# Patient Record
Sex: FEMALE | Race: BLACK OR AFRICAN AMERICAN | NOT HISPANIC OR LATINO | Employment: UNEMPLOYED | URBAN - METROPOLITAN AREA
[De-identification: names, ages, dates, MRNs, and addresses within clinical notes are randomized per-mention and may not be internally consistent; named-entity substitution may affect disease eponyms.]

---

## 2017-01-19 ENCOUNTER — HOSPITAL ENCOUNTER (EMERGENCY)
Facility: OTHER | Age: 32
Discharge: HOME | End: 2017-01-19
Attending: EMERGENCY MEDICINE
Payer: MEDICAID

## 2017-01-19 VITALS
SYSTOLIC BLOOD PRESSURE: 120 MMHG | BODY MASS INDEX: 25.71 KG/M2 | DIASTOLIC BLOOD PRESSURE: 80 MMHG | HEART RATE: 69 BPM | TEMPERATURE: 98 F | OXYGEN SATURATION: 99 % | RESPIRATION RATE: 70 BRPM | HEIGHT: 66 IN | WEIGHT: 160 LBS

## 2017-01-19 DIAGNOSIS — S16.1XXA NECK STRAIN, INITIAL ENCOUNTER: Primary | ICD-10-CM

## 2017-01-19 LAB
B-HCG UR QL: NEGATIVE
CTP QC/QA: YES

## 2017-01-19 PROCEDURE — 99283 EMERGENCY DEPT VISIT LOW MDM: CPT | Mod: 25

## 2017-01-19 PROCEDURE — 96372 THER/PROPH/DIAG INJ SC/IM: CPT

## 2017-01-19 RX ORDER — METHOCARBAMOL 500 MG/1
1000 TABLET, FILM COATED ORAL 3 TIMES DAILY PRN
Qty: 20 TABLET | Refills: 0 | Status: SHIPPED | OUTPATIENT
Start: 2017-01-19 | End: 2017-01-24

## 2017-01-19 RX ORDER — IBUPROFEN 800 MG/1
800 TABLET ORAL EVERY 6 HOURS PRN
Qty: 20 TABLET | Refills: 0 | OUTPATIENT
Start: 2017-01-19 | End: 2018-09-26

## 2017-01-19 RX ORDER — KETOROLAC TROMETHAMINE 30 MG/ML
60 INJECTION, SOLUTION INTRAMUSCULAR; INTRAVENOUS
Status: COMPLETED | OUTPATIENT
Start: 2017-01-19 | End: 2017-01-19

## 2017-01-19 RX ORDER — METHOCARBAMOL 500 MG/1
1000 TABLET, FILM COATED ORAL
Status: COMPLETED | OUTPATIENT
Start: 2017-01-19 | End: 2017-01-19

## 2017-01-19 RX ADMIN — KETOROLAC TROMETHAMINE 60 MG: 30 INJECTION, SOLUTION INTRAMUSCULAR at 09:01

## 2017-01-19 RX ADMIN — METHOCARBAMOL 1000 MG: 500 TABLET ORAL at 09:01

## 2017-01-19 NOTE — ED NOTES
"Ambulated to ED room 13.  Presents to the ED with posterior neck pain and upper back pain since Sunday/5 days.  "I can't move my neck and my neck is starting to feel swollen."  Pain with flexion of the neck.  Pain with rotation of the neck.  Does not recall lifting any heavy items.  Denies any falls, trauma, injury or MVA.  Denies any incontinence of urine or stool.  Denies any numbness or tingling.    "

## 2017-01-19 NOTE — ED AVS SNAPSHOT
[facility]  [address]  [facility] [address]               Carlyn Regan   2017  8:55 AM   ED    Description:  Female : 1985   Department:  [facility]           Your Care was Coordinated By:     Provider Role From To    Usha Lopez MD Attending Provider 17 0859 --      Reason for Visit     Neck Pain           Diagnoses this Visit        Comments    Neck strain, initial encounter    -  Primary       ED Disposition     None           To Do List           Follow-up Information     Follow up with Denice Nolan MD.    Specialty:  Internal Medicine    Contact information:    [address]  [address]  [place] [address]  [phone]         These Medications        Disp Refills Start End    methocarbamol (ROBAXIN) 500 MG Tab 20 tablet 0 2017    Take 2 tablets (1,000 mg total) by mouth 3 (three) times daily as needed. - Oral    Pharmacy: [facility] [address] [place] [address] [address] Ph #: [phone]       ibuprofen (ADVIL,MOTRIN) 800 MG tablet 20 tablet 0 2017     Take 1 tablet (800 mg total) by mouth every 6 (six) hours as needed for Pain. - Oral    Pharmacy: [facility] [address] [place] [address] [address] Ph #: [phone]         [facility]     [facility] Nurse Care Line -  Assistance  Registered nurses in the [facility] provide clinical advisement, health education, appointment booking, and other advisory services.  Call for this free service at [phone].             Medications           Message regarding Medications     Verify the changes and/or additions to your medication regime listed below are the same as discussed with your clinician today.  If any of these changes or additions are incorrect, please notify your healthcare provider.        START  "taking these NEW medications        Refills    methocarbamol (ROBAXIN) 500 MG Tab 0    Sig: Take 2 tablets (1,000 mg total) by mouth 3 (three) times daily as needed.    Class: Print    Route: Oral    ibuprofen (ADVIL,MOTRIN) 800 MG tablet 0    Sig: Take 1 tablet (800 mg total) by mouth every 6 (six) hours as needed for Pain.    Class: Print    Route: Oral      These medications were administered today        Dose Freq    ketorolac injection 60 mg 60 mg ED 1 Time    Sig: Inject 60 mg into the muscle ED 1 Time.    Class: Normal    Route: Intramuscular    methocarbamol tablet 1,000 mg 1,000 mg ED 1 Time    Sig: Take 2 tablets (1,000 mg total) by mouth ED 1 Time.    Class: Normal    Route: Oral           Verify that the below list of medications is an accurate representation of the medications you are currently taking.  If none reported, the list may be blank. If incorrect, please contact your healthcare provider. Carry this list with you in case of emergency.           Current Medications     albuterol 90 mcg/actuation inhaler Inhale 2 puffs into the lungs every 6 (six) hours as needed for Wheezing.    ibuprofen (ADVIL,MOTRIN) 800 MG tablet Take 1 tablet (800 mg total) by mouth every 6 (six) hours as needed for Pain.    methocarbamol (ROBAXIN) 500 MG Tab Take 2 tablets (1,000 mg total) by mouth 3 (three) times daily as needed.           Clinical Reference Information           Your Vitals Were     BP Pulse Temp Resp Height Weight    120/80 (BP Location: Left arm, Patient Position: Sitting) 69 98.1 °F (36.7 °C) (Oral) 70 5' 6" (1.676 m) 72.6 kg (160 lb)    Last Period SpO2 BMI          01/06/2017 (Approximate) 99% 25.82 kg/m2        Allergies as of 1/19/2017        Reactions    Oxycodone Hives      Immunizations Administered on Date of Encounter - 1/19/2017     None      ED Micro, Lab, POCT     Start Ordered       Status Ordering Provider    01/19/17 0914 01/19/17 0913  POCT urine pregnancy  Once      Final result     "   ED Imaging Orders     None        Discharge Instructions       We have prescribed you medication for pain and muscle relaxants. Please fill and take as directed.    Please return to the ER if you have chest pain, difficulty breathing, fevers, altered mental status, dizziness, weakness, or any other concerns.      Follow up with your primary care physician.        Discharge References/Attachments     NECK PROBLEMS, UNDERSTANDING (ENGLISH)      [facility] Sign-Up     Activating your [facility] account is as easy as 1-2-3!     1) Visit [email or web address], select Sign Up Now, enter this activation code and your date of birth, then select Next.  [email or web address]  Expires: 3/5/2017  9:27 AM      2) Create a username and password to use when you visit [facility] in the future and select a security question in case you lose your password and select Next.    3) Enter your e-mail address and click Sign Up!    Additional Information  If you have questions, please e-mail [email or web address] or call [phone] to talk to our [facility] staff. Remember, [facility] is NOT to be used for urgent needs. For medical emergencies, dial 911.          [facility] complies with applicable Federal civil rights laws and does not discriminate on the basis of race, color, national origin, age, disability, or sex.        Language Assistance Services     ATTENTION: Language assistance services are available, free of charge. Please call [phone].      ATENCIÓN: Si habla español, tiene a fong disposición servicios gratuitos de asistencia lingüística. Llame al [phone].     [facility] Ý: N?u b?n nói Ti?ng Vi?t, có các d?ch v? h? tr? ngôn ng? mi?n phí dành cho b?n. G?i s? [phone].

## 2017-01-19 NOTE — ED PROVIDER NOTES
Encounter Date: 1/19/2017    SCRIBE #1 NOTE: I, Nora Key, am scribing for, and in the presence of, Dr. Lopez.       History     Chief Complaint   Patient presents with    Neck Pain     pt with  neck pain  and shulder pain x one week .     Review of patient's allergies indicates:   Allergen Reactions    Oxycodone Hives     HPI Comments: Time seen by provider: 9:01 AM    This is a 31 y.o. female with history of migraines who presents with complaint of bilateral neck pain that began 5 days ago and has been constant since its onset.  The patient reports that her pain radiates to the shoulders bilaterally as well to her upper back.  She reports no arm pain, weakness, numbness, or tingling.  She mentions that her pain has been triggering her migraines, which have not been relieved by her current migraine medication, Fioricet.  She states that her pain is worse with movement and during the mornings.  She reports that she has taken goodies, which usually alleviates her pain, as well as tylenol without relief.  She denies any trauma, including injuries or MVC, which may have precipitated her discomfort.    The history is provided by the patient.     Past Medical History   Diagnosis Date    Migraine headache      Past Medical History Pertinent Negatives   Diagnosis Date Noted    Diabetes mellitus 1/19/2017    Hypertension 1/19/2017     History reviewed. No pertinent past surgical history.  History reviewed. No pertinent family history.  Social History   Substance Use Topics    Smoking status: Never Smoker    Smokeless tobacco: Never Used    Alcohol use No     Review of Systems   Constitutional: Negative for chills and fever.   HENT: Negative for facial swelling.    Respiratory: Negative for shortness of breath.    Cardiovascular: Negative for chest pain.   Gastrointestinal: Negative for abdominal pain, nausea and vomiting.   Endocrine: Negative for polyuria.   Genitourinary: Negative for dysuria.   Musculoskeletal:  Positive for arthralgias (bilateral shoulders), back pain (upper) and neck pain. Negative for myalgias.        Negative for arm pain bilaterally.   Skin: Negative for rash.   Neurological: Positive for weakness, numbness and headaches.        Negative for tingling.       Physical Exam   Initial Vitals   BP Pulse Resp Temp SpO2   01/19/17 0845 01/19/17 0845 01/19/17 0845 01/19/17 0845 01/19/17 0845   120/80 69 70 98.1 °F (36.7 °C) 99 %     Physical Exam    Nursing note and vitals reviewed.  Constitutional: She appears well-developed and well-nourished. She is not diaphoretic. No distress.   HENT:   Head: Normocephalic and atraumatic.   Right Ear: External ear normal.   Left Ear: External ear normal.   Eyes: Conjunctivae and EOM are normal. Right eye exhibits no discharge. Left eye exhibits no discharge.   Neck: Normal range of motion. Neck supple.   Cardiovascular: Normal rate, regular rhythm and normal heart sounds.   Pulses:       Radial pulses are 2+ on the right side, and 2+ on the left side.   Pulmonary/Chest: Breath sounds normal. No respiratory distress. She has no wheezes. She has no rhonchi. She has no rales.   Musculoskeletal: Normal range of motion. She exhibits no edema or tenderness.   Tenderness along the superior portion of the trapezius muscles bilaterally extending across both shoulders. Increased pain with abduction of upper extremities.  No midline C spine tenderness.  Full ROM intact in all extremities.     Neurological: She is alert and oriented to person, place, and time.   Strength 5/5 to bilateral upper extremities.  Sensation to light touch intact.    Skin: Skin is warm and dry. No rash and no abscess noted. No erythema. No pallor.   Psychiatric: She has a normal mood and affect. Her behavior is normal. Judgment and thought content normal.         ED Course   Procedures  Labs Reviewed   POCT URINE PREGNANCY      Imaging Results     None                  Medical Decision Making:   History:    Old Medical Records: I decided to obtain old medical records.  Old Records Summarized: records from clinic visits, records from previous admission(s) and other records.  Initial Assessment:   9:01AM:  Pt is a 32 y/o F who presents to ED with bilateral neck pain/upper shoulder pain/upper back pain. Pt appears well, nontoxic. She has ROM of her neck intact and she is neurologically intact.  She does have tenderness along the upper trapezius muscles bilaterally.  I do suspect that her pain is MSK in nature.  I do not feel that any imaging is warranted at this time.  Will plan for toradol and robaxin here along with muscle relaxants and anti-inflammatory medication for home. I counseled pt regarding supportive care measures.  I have discussed with the pt ED return warnings and need for close PCP f/u.  Pt agreeable to plan and all questions answered.  I feel that pt is stable for discharge and management as an outpatient and no further intervention is needed at this time.  Pt is comfortable returning to the ED if needed.  Will DC home in stable condition.      Clinical Tests:   Lab Tests: Ordered and Reviewed            Scribe Attestation:   Scribe #1: I performed the above scribed service and the documentation accurately describes the services I performed. I attest to the accuracy of the note.    Attending Attestation:           Physician Attestation for Scribe:  Physician Attestation Statement for Scribe #1: I, Dr. Lopez, reviewed documentation, as scribed by Nora Key in my presence, and it is both accurate and complete.                 ED Course     Clinical Impression:     1. Neck strain, initial encounter                Usha Lopez MD  01/19/17 3838

## 2017-01-19 NOTE — DISCHARGE INSTRUCTIONS
We have prescribed you medication for pain and muscle relaxants. Please fill and take as directed.    Please return to the ER if you have chest pain, difficulty breathing, fevers, altered mental status, dizziness, weakness, or any other concerns.      Follow up with your primary care physician.

## 2017-04-16 ENCOUNTER — HOSPITAL ENCOUNTER (EMERGENCY)
Facility: OTHER | Age: 32
Discharge: HOME | End: 2017-04-16
Attending: EMERGENCY MEDICINE
Payer: MEDICAID

## 2017-04-16 VITALS
TEMPERATURE: 98 F | HEIGHT: 66 IN | OXYGEN SATURATION: 96 % | RESPIRATION RATE: 16 BRPM | BODY MASS INDEX: 33.11 KG/M2 | DIASTOLIC BLOOD PRESSURE: 78 MMHG | SYSTOLIC BLOOD PRESSURE: 134 MMHG | HEART RATE: 84 BPM | WEIGHT: 206 LBS

## 2017-04-16 DIAGNOSIS — N30.01 ACUTE CYSTITIS WITH HEMATURIA: Primary | ICD-10-CM

## 2017-04-16 LAB
B-HCG UR QL: NEGATIVE
BACTERIA #/AREA URNS HPF: ABNORMAL /HPF
BILIRUB UR QL STRIP: NEGATIVE
CLARITY UR: ABNORMAL
COLOR UR: YELLOW
CTP QC/QA: YES
GLUCOSE UR QL STRIP: NEGATIVE
HGB UR QL STRIP: ABNORMAL
HYALINE CASTS #/AREA URNS LPF: 0 /LPF
KETONES UR QL STRIP: NEGATIVE
LEUKOCYTE ESTERASE UR QL STRIP: ABNORMAL
MICROSCOPIC COMMENT: ABNORMAL
NITRITE UR QL STRIP: POSITIVE
PH UR STRIP: 8 [PH] (ref 5–8)
PROT UR QL STRIP: ABNORMAL
RBC #/AREA URNS HPF: 12 /HPF (ref 0–4)
SP GR UR STRIP: 1.01 (ref 1–1.03)
SQUAMOUS #/AREA URNS HPF: 2 /HPF
URN SPEC COLLECT METH UR: ABNORMAL
UROBILINOGEN UR STRIP-ACNC: NEGATIVE EU/DL
WBC #/AREA URNS HPF: 45 /HPF (ref 0–5)

## 2017-04-16 PROCEDURE — 25000003 PHARM REV CODE 250: Performed by: EMERGENCY MEDICINE

## 2017-04-16 PROCEDURE — 96372 THER/PROPH/DIAG INJ SC/IM: CPT

## 2017-04-16 PROCEDURE — 81025 URINE PREGNANCY TEST: CPT | Performed by: EMERGENCY MEDICINE

## 2017-04-16 PROCEDURE — 99283 EMERGENCY DEPT VISIT LOW MDM: CPT | Mod: 25

## 2017-04-16 PROCEDURE — 63600175 PHARM REV CODE 636 W HCPCS: Performed by: EMERGENCY MEDICINE

## 2017-04-16 PROCEDURE — 81000 URINALYSIS NONAUTO W/SCOPE: CPT

## 2017-04-16 RX ORDER — DICYCLOMINE HYDROCHLORIDE 10 MG/1
20 CAPSULE ORAL
Status: COMPLETED | OUTPATIENT
Start: 2017-04-16 | End: 2017-04-16

## 2017-04-16 RX ORDER — KETOROLAC TROMETHAMINE 30 MG/ML
60 INJECTION, SOLUTION INTRAMUSCULAR; INTRAVENOUS
Status: COMPLETED | OUTPATIENT
Start: 2017-04-16 | End: 2017-04-16

## 2017-04-16 RX ORDER — PHENAZOPYRIDINE HYDROCHLORIDE 200 MG/1
200 TABLET, FILM COATED ORAL 3 TIMES DAILY
Qty: 6 TABLET | Refills: 0 | Status: SHIPPED | OUTPATIENT
Start: 2017-04-16 | End: 2017-04-26

## 2017-04-16 RX ORDER — SULFAMETHOXAZOLE AND TRIMETHOPRIM 800; 160 MG/1; MG/1
1 TABLET ORAL 2 TIMES DAILY
Qty: 14 TABLET | Refills: 0 | Status: SHIPPED | OUTPATIENT
Start: 2017-04-16 | End: 2017-04-23

## 2017-04-16 RX ADMIN — KETOROLAC TROMETHAMINE 60 MG: 30 INJECTION, SOLUTION INTRAMUSCULAR at 03:04

## 2017-04-16 RX ADMIN — DICYCLOMINE HYDROCHLORIDE 20 MG: 10 CAPSULE ORAL at 03:04

## 2017-04-16 NOTE — DISCHARGE INSTRUCTIONS
We have prescribed you antibiotics. Please fill and take as directed. It is important that you completely finish the antibiotics.      We have provided you with medication for the pain. Please fill and take as directed.    Please return to the ER if you have chest pain, difficulty breathing, fevers, altered mental status, dizziness, weakness, or any other concerns.      Follow up with your primary care physician.

## 2017-04-16 NOTE — ED AVS SNAPSHOT
[facility]  [address]  [facility] [address]               Carlyn Regan   2017  3:13 PM   ED    Description:  Female : 1985   Department:  [facility]           Your Care was Coordinated By:     Provider Role From To    Usha Lopez MD Attending Provider 17 1514 --      Reason for Visit     Abdominal Cramping           Diagnoses this Visit        Comments    Acute cystitis with hematuria    -  Primary       ED Disposition     None           To Do List           Follow-up Information     Follow up with Denice Nolan MD.    Specialty:  Internal Medicine    Contact information:    [address]  [address]  [place] [address]  [phone]         These Medications        Disp Refills Start End    phenazopyridine (PYRIDIUM) 200 MG tablet 6 tablet 0 2017    Take 1 tablet (200 mg total) by mouth 3 (three) times daily. - Oral    Pharmacy: [facility] [address] [place] [address] [address] Ph #: [phone]       sulfamethoxazole-trimethoprim 800-160mg (BACTRIM DS) 800-160 mg Tab 14 tablet 0 2017    Take 1 tablet by mouth 2 (two) times daily. - Oral    Pharmacy: [facility] [address] [place] [address] [address] Ph #: [phone]         [facility]     [facility] Nurse Care Line -  Assistance  Unless otherwise directed by your provider, please contact [facility], our nurse care line that is available for / assistance.     Registered nurses in the [facility] provide: appointment scheduling, clinical advisement, health education, and other advisory services.  Call: [phone] (toll free)               Medications           Message regarding Medications     Verify the changes and/or additions to your medication regime listed below are the same as discussed  "with your clinician today.  If any of these changes or additions are incorrect, please notify your healthcare provider.        START taking these NEW medications        Refills    phenazopyridine (PYRIDIUM) 200 MG tablet 0    Sig: Take 1 tablet (200 mg total) by mouth 3 (three) times daily.    Class: Print    Route: Oral    sulfamethoxazole-trimethoprim 800-160mg (BACTRIM DS) 800-160 mg Tab 0    Sig: Take 1 tablet by mouth 2 (two) times daily.    Class: Print    Route: Oral      These medications were administered today        Dose Freq    dicyclomine capsule 20 mg 20 mg ED 1 Time    Sig: Take 2 capsules (20 mg total) by mouth ED 1 Time.    Class: Normal    Route: Oral    ketorolac injection 60 mg 60 mg ED 1 Time    Sig: Inject 60 mg into the muscle ED 1 Time.    Class: Normal    Route: Intramuscular           Verify that the below list of medications is an accurate representation of the medications you are currently taking.  If none reported, the list may be blank. If incorrect, please contact your healthcare provider. Carry this list with you in case of emergency.           Current Medications     ibuprofen (ADVIL,MOTRIN) 800 MG tablet Take 1 tablet (800 mg total) by mouth every 6 (six) hours as needed for Pain.    UNKNOWN TO PATIENT     albuterol 90 mcg/actuation inhaler Inhale 2 puffs into the lungs every 6 (six) hours as needed for Wheezing.    phenazopyridine (PYRIDIUM) 200 MG tablet Take 1 tablet (200 mg total) by mouth 3 (three) times daily.    sulfamethoxazole-trimethoprim 800-160mg (BACTRIM DS) 800-160 mg Tab Take 1 tablet by mouth 2 (two) times daily.           Clinical Reference Information           Your Vitals Were     BP Pulse Temp Resp Height Weight    138/83 94 98.2 °F (36.8 °C) (Oral) 18 5' 6" (1.676 m) 93.4 kg (206 lb)    SpO2 BMI             98% 33.25 kg/m2         Allergies as of 4/16/2017     No Known Allergies      Immunizations Administered on Date of Encounter - 4/16/2017     None      ED " Micro, Lab, POCT     Start Ordered       Status Ordering Provider    04/16/17 1510 04/16/17 1509  Urinalysis  STAT      Final result     04/16/17 1510 04/16/17 1509  POCT urine pregnancy  Once      Final result     04/16/17 1509 04/16/17 1509  Urinalysis Microscopic  Once      Final result       ED Imaging Orders     None        Discharge Instructions       We have prescribed you antibiotics. Please fill and take as directed. It is important that you completely finish the antibiotics.      We have provided you with medication for the pain. Please fill and take as directed.    Please return to the ER if you have chest pain, difficulty breathing, fevers, altered mental status, dizziness, weakness, or any other concerns.      Follow up with your primary care physician.        Discharge References/Attachments     URINARY TRACT INFECTIONS (UTIS), UNDERSTANDING (ENGLISH)      [facility] Sign-Up     Activating your [facility] account is as easy as 1-2-3!     1) Visit [email or web address], select Sign Up Now, enter this activation code and your date of birth, then select Next.  [email or web address]  Expires: 5/31/2017  3:15 PM      2) Create a username and password to use when you visit [facility] in the future and select a security question in case you lose your password and select Next.    3) Enter your e-mail address and click Sign Up!    Additional Information  If you have questions, please e-mail [email or web address] or call [phone] to talk to our [facility] staff. Remember, [facility] is NOT to be used for urgent needs. For medical emergencies, dial 911.          [facility] complies with applicable Federal civil rights laws and does not discriminate on the basis of race, color, national origin, age, disability, or sex.        Language Assistance Services     ATTENTION: Language assistance services are available, free of charge. Please call [phone].      ATENCIÓN: mariluz Perdomo  disposición servicios gratuitos de asistencia lingüística. Llenrique al [phone].     [facility] Ý: N?u b?n nói Ti?ng Vi?t, có các d?ch v? h? tr? ngôn ng? mi?n phí dành cho b?n. G?i s? [phone].

## 2017-04-16 NOTE — ED TRIAGE NOTES
"CO abdominal pain starting 2 days ago after eating crawfish, unable to have BM, episodes of regurgitation, denies any N/V at present. Pt reports pain in her stomach as a 7 on a 0-10 scale, feels "backed up, like I cant have a bowel movement".  "

## 2017-04-16 NOTE — ED NOTES
Pt updated on POC. Comfort, position and restroom needs addressed. Bed remains in lowest position, side rails up x2, call light in reach. Pt instructed to call for assistance.

## 2017-04-16 NOTE — ED PROVIDER NOTES
"Encounter Date: 4/16/2017    SCRIBE #1 NOTE: I, Nora Shahid, am scribing for, and in the presence of, Dr. Lopez.       History     Chief Complaint   Patient presents with    Abdominal Cramping     ate crabs and crawfish yesterday, x30 min later one episode of emesis and cramping, abd cramps have lessened. denies nausea at this time     Review of patient's allergies indicates:  No Known Allergies  HPI Comments: Time seen by provider: 3:24 PM    This is a 32 y.o. female who presents with complaint of abdominal pain that has persisted since its onset yesterday.  The patient states that her abdominal "cramping" began after eating seafood yesterday.  She also claims that she had one episode of nausea and vomiting yesterday, but it has completely resolved.  She reports a separate complaint of dysuria and increased urinary urgency that have persisted for the past 2 days, which she "just wanted to get checked out" while she is here.  She mentions no fever, chills, diarrhea, or changes in urinary urgency.  Although her symptoms have improved since their onset, the patient states that she does not feel "back to normal just yet."  She reports no identifying, alleviating, or exacerbating factors.  She mentions no major medical problems or daily prescription medications.      The history is provided by the patient.     Past Medical History:   Diagnosis Date    Migraine headache      History reviewed. No pertinent surgical history.  History reviewed. No pertinent family history.  Social History   Substance Use Topics    Smoking status: Never Smoker    Smokeless tobacco: Never Used    Alcohol use No     Review of Systems   Constitutional: Negative for chills and fever.   HENT: Negative for facial swelling.    Respiratory: Negative for shortness of breath.    Cardiovascular: Negative for chest pain.   Gastrointestinal: Positive for abdominal pain. Negative for diarrhea. Nausea: Resolved. Vomiting: Resolved.   Endocrine: " Negative for polyuria.   Genitourinary: Positive for dysuria and urgency.   Musculoskeletal: Negative for myalgias.   Skin: Negative for rash.   Neurological: Negative for headaches.       Physical Exam   Initial Vitals   BP Pulse Resp Temp SpO2   04/16/17 1507 04/16/17 1507 04/16/17 1507 04/16/17 1507 04/16/17 1507   125/77 98 18 98.2 °F (36.8 °C) 100 %     Physical Exam    Nursing note and vitals reviewed.  Constitutional: She appears well-developed and well-nourished. She is not diaphoretic. No distress.   HENT:   Head: Normocephalic and atraumatic.   Right Ear: External ear normal.   Left Ear: External ear normal.   Eyes: EOM are normal. Right eye exhibits no discharge. Left eye exhibits no discharge.   Neck: Normal range of motion. Neck supple.   Cardiovascular: Normal rate, regular rhythm and normal heart sounds.   Pulmonary/Chest: Breath sounds normal. No respiratory distress. She has no wheezes. She has no rhonchi. She has no rales.   Abdominal: Soft. Bowel sounds are normal. She exhibits no distension. There is no tenderness. There is no rebound, no guarding and no CVA tenderness.   Musculoskeletal: Normal range of motion. She exhibits no edema or tenderness.   Neurological: She is alert and oriented to person, place, and time.   Skin: Skin is warm and dry. No rash and no abscess noted. No erythema. No pallor.   Psychiatric: She has a normal mood and affect. Her behavior is normal. Judgment and thought content normal.         ED Course   Procedures  Labs Reviewed   URINALYSIS - Abnormal; Notable for the following:        Result Value    Appearance, UA Hazy (*)     Protein, UA 1+ (*)     Occult Blood UA 3+ (*)     Nitrite, UA Positive (*)     Leukocytes, UA 3+ (*)     All other components within normal limits   URINALYSIS MICROSCOPIC - Abnormal; Notable for the following:     RBC, UA 12 (*)     WBC, UA 45 (*)     Bacteria, UA Moderate (*)     All other components within normal limits   POCT URINE PREGNANCY      Imaging Results     None                  Medical Decision Making:   History:   Old Medical Records: I decided to obtain old medical records.  Old Records Summarized: records from previous admission(s), other records and records from clinic visits.  Initial Assessment:   3:24PM:  Pt is a 33 y/o F who presents to ED with lower abdominal pain and an episode of N/V yesterday. She does also admit to associated urinary symptoms.  Will plan for UA along with analgesia, will continue to follow and reassess.    Clinical Tests:   Lab Tests: Ordered and Reviewed    4:16 PM:  Pt doing well.  She does have evidence of a UTI.  She has positive nitrite, 3+ leukocytes, and moderate bacteria, likely contributing to her symptoms.  Will plan for abx and pyridium. I updated pt regarding results and counseled pt regarding supportive care measures.  I have discussed with the pt ED return warnings and need for close PCP f/u.  Pt agreeable to plan and all questions answered.  I feel that pt is stable for discharge and management as an outpatient and no further intervention is needed at this time.  Pt is comfortable returning to the ED if needed.  Will DC home in stable condition.                   Scribe Attestation:   Scribe #1: I performed the above scribed service and the documentation accurately describes the services I performed. I attest to the accuracy of the note.    Attending Attestation:           Physician Attestation for Scribe:  Physician Attestation Statement for Scribe #1: I, Dr. Lopez, reviewed documentation, as scribed by Nora Key in my presence, and it is both accurate and complete.                 ED Course     Clinical Impression:     1. Acute cystitis with hematuria                Usha Lopez MD  04/16/17 8221

## 2017-08-09 ENCOUNTER — HOSPITAL ENCOUNTER (OUTPATIENT)
Dept: RADIOLOGY | Facility: OTHER | Age: 32
Discharge: HOME | End: 2017-08-09
Attending: PSYCHIATRY & NEUROLOGY
Payer: MEDICAID

## 2017-08-09 DIAGNOSIS — G43.009 MIGRAINE WITHOUT AURA AND WITHOUT STATUS MIGRAINOSUS, NOT INTRACTABLE: ICD-10-CM

## 2017-08-09 PROCEDURE — 70551 MRI BRAIN STEM W/O DYE: CPT | Mod: 26,,, | Performed by: RADIOLOGY

## 2017-08-09 PROCEDURE — 70551 MRI BRAIN STEM W/O DYE: CPT | Mod: TC

## 2018-09-26 ENCOUNTER — HOSPITAL ENCOUNTER (EMERGENCY)
Facility: OTHER | Age: 33
Discharge: HOME | End: 2018-09-26
Attending: EMERGENCY MEDICINE
Payer: MEDICAID

## 2018-09-26 VITALS
HEIGHT: 66 IN | SYSTOLIC BLOOD PRESSURE: 106 MMHG | DIASTOLIC BLOOD PRESSURE: 72 MMHG | TEMPERATURE: 98 F | RESPIRATION RATE: 18 BRPM | WEIGHT: 158.19 LBS | HEART RATE: 66 BPM | OXYGEN SATURATION: 100 % | BODY MASS INDEX: 25.42 KG/M2

## 2018-09-26 DIAGNOSIS — M25.572 ANKLE PAIN, LEFT: ICD-10-CM

## 2018-09-26 DIAGNOSIS — M79.672 FOOT PAIN, LEFT: ICD-10-CM

## 2018-09-26 DIAGNOSIS — S93.402A MILD ANKLE SPRAIN, LEFT, INITIAL ENCOUNTER: Primary | ICD-10-CM

## 2018-09-26 LAB
B-HCG UR QL: NEGATIVE
CTP QC/QA: YES

## 2018-09-26 PROCEDURE — 81025 URINE PREGNANCY TEST: CPT | Performed by: EMERGENCY MEDICINE

## 2018-09-26 PROCEDURE — 99284 EMERGENCY DEPT VISIT MOD MDM: CPT | Mod: 25

## 2018-09-26 RX ORDER — IBUPROFEN 600 MG/1
600 TABLET ORAL EVERY 6 HOURS PRN
Qty: 20 TABLET | Refills: 0 | OUTPATIENT
Start: 2018-09-26 | End: 2019-12-16

## 2018-09-26 NOTE — ED TRIAGE NOTES
"Pt Presents to ED with C/O left ankle pain x 3 weeks. Pt denies chest pain, SOB, N/V/D. Pt states " it hurts the most when I walk on it". Pt admits to having an MRI because of history of migraines. Pt AAO x 4, calm and cooperative.   "

## 2018-09-26 NOTE — ED PROVIDER NOTES
Encounter Date: 9/26/2018    SCRIBE #1 NOTE: I, Lencho Rosario, am scribing for, and in the presence of, Dr. Matson.       History     Chief Complaint   Patient presents with    Ankle Pain     L ankle pain x 1 wk. reports working out frequently but denies any known injury     Time seen by provider: 12:04 PM    This is a 33 y.o. female with a history of migraines who presents with complaint of left ankle pain that began approximately one week ago. She denies any fall or injury to the left ankle. She reports that she is unable to put pressure on the ankle without pain. She reports that when the pain began she was still able to exercise on it without trouble.  She reports that when she runs on it everyday, which causes exacerbates the pain. She has been taking ibuprofen and aspirin with little relief. She denies fever, sore throat, chest pain, shortness of breath, cough, nausea, vomiting, diarrhea, and dysuria.      The history is provided by the patient.     Review of patient's allergies indicates:  No Known Allergies  Past Medical History:   Diagnosis Date    Migraine headache      History reviewed. No pertinent surgical history.  History reviewed. No pertinent family history.  Social History     Tobacco Use    Smoking status: Never Smoker    Smokeless tobacco: Never Used   Substance Use Topics    Alcohol use: No    Drug use: No     Review of Systems   Constitutional: Negative for fever.   HENT: Negative for sore throat.    Respiratory: Negative for shortness of breath.    Cardiovascular: Negative for chest pain.   Gastrointestinal: Negative for nausea.   Genitourinary: Negative for dysuria.   Musculoskeletal: Negative for back pain.        Positive for left ankle pain.   Skin: Negative for rash.   Neurological: Negative for weakness.   Hematological: Does not bruise/bleed easily.       Physical Exam     Initial Vitals [09/26/18 1116]   BP Pulse Resp Temp SpO2   110/68 77 18 98.2 °F (36.8 °C) 100 %      MAP        --         Physical Exam    Nursing note and vitals reviewed.  Constitutional: She appears well-developed and well-nourished. She is not diaphoretic. No distress.   HENT:   Head: Normocephalic and atraumatic.   Right Ear: External ear normal.   Left Ear: External ear normal.   Eyes: EOM are normal. Pupils are equal, round, and reactive to light. Right eye exhibits no discharge. Left eye exhibits no discharge.   Neck: Normal range of motion.   Cardiovascular: Normal rate, regular rhythm and normal heart sounds. Exam reveals no gallop and no friction rub.    No murmur heard.  Pulmonary/Chest: Breath sounds normal. No respiratory distress. She has no wheezes. She has no rhonchi. She has no rales.   Abdominal: Soft. There is no tenderness. There is no rebound and no guarding.   Musculoskeletal: Normal range of motion. She exhibits tenderness. She exhibits no edema.   Left lower extremity:  Tenderness of bilateral malleoli and base of fifth metatarsal. No tenderness to the fibular head. 2+ DP pulses.    Neurological: She is alert and oriented to person, place, and time. She has normal strength. No sensory deficit.   Skin: Skin is warm and dry. No rash and no abscess noted. No erythema. No pallor.   Psychiatric: She has a normal mood and affect. Her behavior is normal. Judgment and thought content normal.         ED Course   Procedures  Labs Reviewed   POCT URINE PREGNANCY          Imaging Results          X-Ray Ankle Complete Left (Final result)  Result time 09/26/18 13:03:05    Final result by Alex Gustafson MD (09/26/18 13:03:05)                 Impression:      No acute abnormality or significant arthritis.      Electronically signed by: Alex Gustafson MD  Date:    09/26/2018  Time:    13:03             Narrative:    EXAMINATION:  XR FOOT COMPLETE 3 VIEW LEFT; XR ANKLE COMPLETE 3 VIEW LEFT    CLINICAL HISTORY:  .  Pain in left foot; Pain in left ankle and joints of left foot    TECHNIQUE:  AP, lateral and  oblique views of the left foot and ankle were performed.    COMPARISON:  None    FINDINGS:  The skeletal structures are intact with satisfactory alignment.  No fracture or dislocation is identified.  Ankle joint space is normal.  Joint spaces in the foot are also satisfactory without significant arthritis.  A bunion is developing.  No focal soft tissue swelling is seen.                               X-Ray Foot Complete Left (Final result)  Result time 09/26/18 13:03:05    Final result by Alex Gustafson MD (09/26/18 13:03:05)                 Impression:      No acute abnormality or significant arthritis.      Electronically signed by: Alex Gustafson MD  Date:    09/26/2018  Time:    13:03             Narrative:    EXAMINATION:  XR FOOT COMPLETE 3 VIEW LEFT; XR ANKLE COMPLETE 3 VIEW LEFT    CLINICAL HISTORY:  .  Pain in left foot; Pain in left ankle and joints of left foot    TECHNIQUE:  AP, lateral and oblique views of the left foot and ankle were performed.    COMPARISON:  None    FINDINGS:  The skeletal structures are intact with satisfactory alignment.  No fracture or dislocation is identified.  Ankle joint space is normal.  Joint spaces in the foot are also satisfactory without significant arthritis.  A bunion is developing.  No focal soft tissue swelling is seen.                              X-Rays:   Independently Interpreted Readings:   Other Readings:  Left ankle: No fracture, dislocation, or acute process.    Medical Decision Making:   Clinical Tests:   Lab Tests: Ordered and Reviewed  Radiological Study: Ordered and Reviewed  ED Management:  Emergent evaluation of a 33-year-old female who presents complaint of left ankle pain, no trauma.  Vital signs are benign, afebrile.  On exam she is tender diffusely over the ankle and also the foot.  There is no evidence of cellulitis or infection, no overlying skin change.  There is no evidence of vascular insufficiency, pulses good in skin is warm.  I  suspect that she had a mild ankle sprain but did not rested it has worsened.  Patient admits she exercises all day every day.  X-ray is negative.  She is advised to rest her foot and ankle, and is prescribed ibuprofen.  She is placed in an Ace wrap.  She is discharged in good condition and encourage close follow-up with PCP or return for new or worsening symptoms.            Scribe Attestation:   Scribe #1: I performed the above scribed service and the documentation accurately describes the services I performed. I attest to the accuracy of the note.    Attending Attestation:           Physician Attestation for Scribe:  Physician Attestation Statement for Scribe #1: I, Dr. Matson, reviewed documentation, as scribed by Lencho Rosario in my presence, and it is both accurate and complete.                    Clinical Impression:     1. Mild ankle sprain, left, initial encounter    2. Foot pain, left    3. Ankle pain, left                                 Jenna Matson MD  09/26/18 [id number]

## 2019-12-16 ENCOUNTER — HOSPITAL ENCOUNTER (EMERGENCY)
Facility: OTHER | Age: 34
Discharge: HOME | End: 2019-12-16
Attending: EMERGENCY MEDICINE
Payer: MEDICAID

## 2019-12-16 VITALS
HEART RATE: 69 BPM | BODY MASS INDEX: 24.99 KG/M2 | OXYGEN SATURATION: 100 % | SYSTOLIC BLOOD PRESSURE: 119 MMHG | TEMPERATURE: 99 F | DIASTOLIC BLOOD PRESSURE: 58 MMHG | HEIGHT: 65 IN | RESPIRATION RATE: 20 BRPM | WEIGHT: 150 LBS

## 2019-12-16 DIAGNOSIS — R60.0 EDEMA OF ABDOMINAL WALL: Primary | ICD-10-CM

## 2019-12-16 LAB
B-HCG UR QL: NEGATIVE
CTP QC/QA: YES

## 2019-12-16 PROCEDURE — 81025 URINE PREGNANCY TEST: CPT | Performed by: EMERGENCY MEDICINE

## 2019-12-16 PROCEDURE — 99284 EMERGENCY DEPT VISIT MOD MDM: CPT | Mod: 25

## 2019-12-16 RX ORDER — ETODOLAC 400 MG/1
400 TABLET, FILM COATED ORAL 2 TIMES DAILY PRN
Qty: 20 TABLET | Refills: 0 | Status: SHIPPED | OUTPATIENT
Start: 2019-12-16

## 2019-12-16 NOTE — DISCHARGE INSTRUCTIONS
"Swelling of the skin over your chest and abdominal wall is called edema. It is caused by extra fluid collecting in the tissues.   Some of the causes for swelling" in only a single leg include:  · Infection in the skin  · Long-term problem with a vein not working well (venous insufficiency)  · Insect bite or sting   · Injury of the skin    Medical treatment will depend on what is causing your swelling.  Home care  Follow these guidelines when caring for yourself at home:  · Dont wear tight clothing.  · Take any medicines as directed.  Follow-up care  Follow up with your healthcare provider as advised.  Call 911  Call 911 if any of these occur:  · Shortness of breath or trouble breathing  · Abdominal pain  · Fevers, chills  · Chest pain  · Coughing up blood  · Fainting or loss of consciousness   When to seek medical advice  Call your healthcare provider right away if any of these occur:  · Increased pain, swelling, warmth, or redness of the leg, ankle, or foot  · Fever of 100.4°F (38ºC) or higher, or as directed by your healthcare provider  · Weakness or dizziness  · Shaking chills  · Drenching sweats    "

## 2019-12-16 NOTE — ED NOTES
Pt to ED with c/o L rib pain for several weeks, comes to ED now for evaluation. TTP along lowest L rib, protrusion noted. Admits to some SOB. Pt denies injury, CP, N/V/D, diaphoresis.     Two patient identifiers have been checked and are correct.      Appearance: Pt awake, alert & oriented to person, place & time. Pt in no acute distress at present time. Pt is clean and well groomed with clothes appropriately fastened.   Skin: Skin warm, dry & intact. Color consistent with ethnicity. Mucous membranes moist. No breakdown or brusing noted. Small protrusion to lowest L rib noted, TTP.   Musculoskeletal: Patient moving all extremities well, no obvious swelling or deformities noted.   Respiratory: Respirations spontaneous, even, and non-labored. Visible chest rise noted. Airway is open and patent. No accessory muscle use noted.   Neurologic: Sensation is intact. Speech is clear and appropriate. Eyes open spontaneously, behavior appropriate to situation, follows commands, facial expression symmetrical, bilateral hand grasp equal and even, purposeful motor response noted.  Cardiac: All peripheral pulses present. No Bilateral lower extremity edema. Cap refill is <3 seconds.  Abdomen: Abdomen soft, non-tender to palpation.   : Pt reports no dysuria or hematuria.

## 2020-09-29 ENCOUNTER — HOSPITAL ENCOUNTER (OUTPATIENT)
Facility: OTHER | Age: 35
Discharge: HOME | End: 2020-09-30
Attending: EMERGENCY MEDICINE | Admitting: EMERGENCY MEDICINE
Payer: MEDICAID

## 2020-09-29 DIAGNOSIS — S09.90XA INJURY OF HEAD, INITIAL ENCOUNTER: Primary | ICD-10-CM

## 2020-09-29 LAB
B-HCG UR QL: NEGATIVE
CTP QC/QA: YES
CTP QC/QA: YES
SARS-COV-2 RDRP RESP QL NAA+PROBE: NEGATIVE

## 2020-09-29 PROCEDURE — 90471 IMMUNIZATION ADMIN: CPT | Performed by: EMERGENCY MEDICINE

## 2020-09-29 PROCEDURE — 25000003 PHARM REV CODE 250: Performed by: EMERGENCY MEDICINE

## 2020-09-29 PROCEDURE — 99285 EMERGENCY DEPT VISIT HI MDM: CPT | Mod: 25

## 2020-09-29 PROCEDURE — 25000003 PHARM REV CODE 250: Performed by: PHYSICIAN ASSISTANT

## 2020-09-29 PROCEDURE — 81025 URINE PREGNANCY TEST: CPT | Performed by: EMERGENCY MEDICINE

## 2020-09-29 PROCEDURE — 90715 TDAP VACCINE 7 YRS/> IM: CPT | Performed by: EMERGENCY MEDICINE

## 2020-09-29 PROCEDURE — G0378 HOSPITAL OBSERVATION PER HR: HCPCS

## 2020-09-29 PROCEDURE — 63600175 PHARM REV CODE 636 W HCPCS: Performed by: EMERGENCY MEDICINE

## 2020-09-29 PROCEDURE — U0002 COVID-19 LAB TEST NON-CDC: HCPCS | Performed by: EMERGENCY MEDICINE

## 2020-09-29 PROCEDURE — 12011 RPR F/E/E/N/L/M 2.5 CM/<: CPT

## 2020-09-29 RX ORDER — ACETAMINOPHEN 500 MG
1000 TABLET ORAL
Status: COMPLETED | OUTPATIENT
Start: 2020-09-29 | End: 2020-09-29

## 2020-09-29 RX ORDER — IBUPROFEN 600 MG/1
600 TABLET ORAL EVERY 6 HOURS PRN
Qty: 20 TABLET | Refills: 0 | Status: SHIPPED | OUTPATIENT
Start: 2020-09-29

## 2020-09-29 RX ORDER — HYDROCODONE BITARTRATE AND ACETAMINOPHEN 5; 325 MG/1; MG/1
1 TABLET ORAL
Status: COMPLETED | OUTPATIENT
Start: 2020-09-29 | End: 2020-09-29

## 2020-09-29 RX ORDER — IBUPROFEN 400 MG/1
800 TABLET ORAL
Status: COMPLETED | OUTPATIENT
Start: 2020-09-29 | End: 2020-09-29

## 2020-09-29 RX ORDER — LIDOCAINE HYDROCHLORIDE 10 MG/ML
5 INJECTION INFILTRATION; PERINEURAL
Status: COMPLETED | OUTPATIENT
Start: 2020-09-29 | End: 2020-09-29

## 2020-09-29 RX ORDER — METHOCARBAMOL 500 MG/1
1000 TABLET, FILM COATED ORAL 3 TIMES DAILY
Qty: 30 TABLET | Refills: 0 | Status: SHIPPED | OUTPATIENT
Start: 2020-09-29 | End: 2020-10-04

## 2020-09-29 RX ORDER — MUPIROCIN 20 MG/G
1 OINTMENT TOPICAL
Status: COMPLETED | OUTPATIENT
Start: 2020-09-29 | End: 2020-09-29

## 2020-09-29 RX ORDER — SODIUM CHLORIDE 0.9 % (FLUSH) 0.9 %
10 SYRINGE (ML) INJECTION
Status: DISCONTINUED | OUTPATIENT
Start: 2020-09-29 | End: 2020-09-30 | Stop reason: HOSPADM

## 2020-09-29 RX ADMIN — HYDROCODONE BITARTRATE AND ACETAMINOPHEN 1 TABLET: 5; 325 TABLET ORAL at 11:09

## 2020-09-29 RX ADMIN — MUPIROCIN 22 G: 20 OINTMENT TOPICAL at 09:09

## 2020-09-29 RX ADMIN — LIDOCAINE HYDROCHLORIDE 5 ML: 10 INJECTION, SOLUTION INFILTRATION; PERINEURAL at 08:09

## 2020-09-29 RX ADMIN — IBUPROFEN 800 MG: 400 TABLET, FILM COATED ORAL at 07:09

## 2020-09-29 RX ADMIN — CLOSTRIDIUM TETANI TOXOID ANTIGEN (FORMALDEHYDE INACTIVATED), CORYNEBACTERIUM DIPHTHERIAE TOXOID ANTIGEN (FORMALDEHYDE INACTIVATED), BORDETELLA PERTUSSIS TOXOID ANTIGEN (GLUTARALDEHYDE INACTIVATED), BORDETELLA PERTUSSIS FILAMENTOUS HEMAGGLUTININ ANTIGEN (FORMALDEHYDE INACTIVATED), BORDETELLA PERTUSSIS PERTACTIN ANTIGEN, AND BORDETELLA PERTUSSIS FIMBRIAE 2/3 ANTIGEN 0.5 ML: 5; 2; 2.5; 5; 3; 5 INJECTION, SUSPENSION INTRAMUSCULAR at 07:09

## 2020-09-29 RX ADMIN — ACETAMINOPHEN 1000 MG: 500 TABLET, FILM COATED ORAL at 07:09
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
## 2020-09-29 NOTE — Clinical Note
"Carlyn MARYAMIRA" Jass was seen and treated in our emergency department on 9/29/2020.  She may return to work on 10/02/2020.       If you have any questions or concerns, please don't hesitate to call.      Fidencio Johnson RN    "

## 2020-09-29 NOTE — Clinical Note
"Yanick Echols accompanied Carlyn DUNCAN "YING Regan to the emergency department on 9/29/2020. They may return to work on 10/02/2020.      If you have any questions or concerns, please don't hesitate to call.      Fidencio Johnson RN"

## 2020-09-30 VITALS
OXYGEN SATURATION: 95 % | WEIGHT: 160 LBS | RESPIRATION RATE: 18 BRPM | DIASTOLIC BLOOD PRESSURE: 66 MMHG | BODY MASS INDEX: 26.66 KG/M2 | HEART RATE: 75 BPM | TEMPERATURE: 98 F | SYSTOLIC BLOOD PRESSURE: 109 MMHG | HEIGHT: 65 IN

## 2020-09-30 PROBLEM — S09.90XA HEAD INJURY: Chronic | Status: ACTIVE | Noted: 2020-09-29

## 2020-09-30 PROBLEM — S01.81XA LACERATION OF FACE: Status: ACTIVE | Noted: 2020-09-30

## 2020-09-30 PROCEDURE — G0378 HOSPITAL OBSERVATION PER HR: HCPCS
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
## 2020-09-30 NOTE — ED NOTES
Patient arrived to unit via w/c, ambulatory to bed without difficulty, visitor at bedside. Patient AAOx4, NAD, denies pain, or any needs at this time. Side rails up x 2, call light placed within reach, denies any needs at this time. Updated on POC.

## 2020-09-30 NOTE — ED NOTES
In patient room after call light initiated. Patient reports mild bleeding to wound. Band aid saturated with blood, with mild uncontrolled bleeding from suture site. Surekha HURD at bedside. Pressure dressing placed.

## 2020-09-30 NOTE — ED NOTES
Patient lying in bed resting comfortably with eyes closed, respirations E/UL, visible chest rise and fall. Side rials up x 2, call light within reach. Will continue to monitor.

## 2020-10-09 ENCOUNTER — HOSPITAL ENCOUNTER (EMERGENCY)
Facility: OTHER | Age: 35
Discharge: HOME | End: 2020-10-09
Attending: EMERGENCY MEDICINE
Payer: MEDICAID

## 2020-10-09 VITALS
SYSTOLIC BLOOD PRESSURE: 122 MMHG | OXYGEN SATURATION: 97 % | HEART RATE: 71 BPM | HEIGHT: 65 IN | TEMPERATURE: 99 F | DIASTOLIC BLOOD PRESSURE: 65 MMHG | BODY MASS INDEX: 26.66 KG/M2 | WEIGHT: 160 LBS | RESPIRATION RATE: 18 BRPM

## 2020-10-09 DIAGNOSIS — Z48.02 VISIT FOR SUTURE REMOVAL: Primary | ICD-10-CM

## 2020-10-09 PROCEDURE — 99282 EMERGENCY DEPT VISIT SF MDM: CPT
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
## 2020-10-12 ENCOUNTER — TELEPHONE (OUTPATIENT)
Dept: NEUROSURGERY | Facility: CLINIC | Age: 35
End: 2020-10-12

## 2020-10-12 NOTE — TELEPHONE ENCOUNTER
Patient has been called and informed that she will need to follow-up outside of [facility] due to herinsurance (medicaid). Patient verbalized clear understanding.

## 2020-10-12 NOTE — TELEPHONE ENCOUNTER
----- Message from Tayla Nugent sent at 10/10/2020 12:35 PM CDT -----  Regarding: Appointment Access  Contact: [phone]  Pt was seen in ED on 9/29 and was told to follow up with Neurosurgery. The pt stated that she will take the first available. Pt stated that she is having some pain. The pt would like the first appointment available. Please call the pt regarding the scheduling.

## 2022-04-30 ENCOUNTER — HOSPITAL ENCOUNTER (EMERGENCY)
Facility: OTHER | Age: 37
Discharge: HOME | End: 2022-04-30
Attending: EMERGENCY MEDICINE
Payer: MEDICAID

## 2022-04-30 VITALS
HEIGHT: 65 IN | TEMPERATURE: 98 F | HEART RATE: 85 BPM | OXYGEN SATURATION: 99 % | BODY MASS INDEX: 36.65 KG/M2 | RESPIRATION RATE: 16 BRPM | SYSTOLIC BLOOD PRESSURE: 128 MMHG | WEIGHT: 220 LBS | DIASTOLIC BLOOD PRESSURE: 74 MMHG

## 2022-04-30 DIAGNOSIS — R10.2 PELVIC PAIN IN FEMALE: Primary | ICD-10-CM

## 2022-04-30 DIAGNOSIS — N30.00 ACUTE CYSTITIS WITHOUT HEMATURIA: ICD-10-CM

## 2022-04-30 LAB
B-HCG UR QL: NEGATIVE
BACTERIA #/AREA URNS HPF: ABNORMAL /HPF
BILIRUB UR QL STRIP: NEGATIVE
CLARITY UR: CLEAR
COLOR UR: YELLOW
CTP QC/QA: YES
GLUCOSE UR QL STRIP: NEGATIVE
HGB UR QL STRIP: ABNORMAL
KETONES UR QL STRIP: NEGATIVE
LEUKOCYTE ESTERASE UR QL STRIP: NEGATIVE
MICROSCOPIC COMMENT: ABNORMAL
NITRITE UR QL STRIP: NEGATIVE
PH UR STRIP: 6 [PH] (ref 5–8)
PROT UR QL STRIP: NEGATIVE
RBC #/AREA URNS HPF: 8 /HPF (ref 0–4)
SP GR UR STRIP: 1.02 (ref 1–1.03)
SQUAMOUS #/AREA URNS HPF: 9 /HPF
URN SPEC COLLECT METH UR: ABNORMAL
UROBILINOGEN UR STRIP-ACNC: NEGATIVE EU/DL
WBC #/AREA URNS HPF: 2 /HPF (ref 0–5)

## 2022-04-30 PROCEDURE — 81000 URINALYSIS NONAUTO W/SCOPE: CPT | Performed by: EMERGENCY MEDICINE

## 2022-04-30 PROCEDURE — 99282 EMERGENCY DEPT VISIT SF MDM: CPT | Mod: 25

## 2022-04-30 PROCEDURE — 81025 URINE PREGNANCY TEST: CPT | Performed by: EMERGENCY MEDICINE

## 2022-05-01 NOTE — ED TRIAGE NOTES
Pt presents to ED c/o lower abd pain. Pt reports pulling sensation. Pt was seen last week by PCP and dx with UTI. Pt is currently on abx. Pt denies CP, SOB, and HA.

## 2022-05-01 NOTE — ED PROVIDER NOTES
Encounter Date: 4/30/2022    SCRIBE #1 NOTE: I, Umer Montelongo III, am scribing for, and in the presence of, Ivone Sher MD.       History     Chief Complaint   Patient presents with    Abdominal Pain     Lower region abdominal pain with nausea x 3 days. Pt reports recent dx of UTI & on day 2 of prescribed oral antibiotics, Bactrim.      Carlyn Regan is a 37 y.o. female who presents to the ED with complaint of intermittent abdominal pain that began 3 days PTA. Patient reports nausea and sharp bilateral flank pain. She was recently dx with a UTI and is on her second day of Bactrim. She saw her PCP last week where she was given a pregnancy test. She states her cycle is next week but she is experiencing some light spotting. She denies any heavy lifting or exercise changes.  No other exacerbating or alleviating factors. Denies any other associated symptoms.       The history is provided by the patient.     Review of patient's allergies indicates:   Allergen Reactions    Asa [aspirin]      Past Medical History:   Diagnosis Date    Migraine headache      No past surgical history on file.  No family history on file.  Social History     Tobacco Use    Smoking status: Never Smoker    Smokeless tobacco: Never Used   Substance Use Topics    Alcohol use: No    Drug use: No     Review of Systems   Constitutional: Negative for chills and diaphoresis.   HENT: Negative for congestion.    Eyes: Negative for discharge.   Respiratory: Negative for shortness of breath.    Cardiovascular: Negative for chest pain.   Gastrointestinal: Positive for abdominal pain and nausea.   Genitourinary: Positive for flank pain and vaginal bleeding. Negative for dysuria.   Musculoskeletal: Negative for back pain.   Neurological: Negative for dizziness.   Hematological: Does not bruise/bleed easily.   All other systems reviewed and are negative.      Physical Exam     Initial Vitals [04/30/22 2205]   BP Pulse Resp Temp SpO2   117/67 80 16  98.2 °F (36.8 °C) 99 %      MAP       --         Physical Exam    Nursing note and vitals reviewed.  Constitutional: She appears well-developed and well-nourished. She does not have a sickly appearance. No distress.   HENT:   Head: Normocephalic and atraumatic.   Right Ear: External ear normal.   Left Ear: External ear normal.   Eyes: Conjunctivae, EOM and lids are normal. Right eye exhibits no discharge. Left eye exhibits no discharge. Right conjunctiva is not injected. Right conjunctiva has no hemorrhage. Left conjunctiva is not injected. Left conjunctiva has no hemorrhage. No scleral icterus.   Neck: Phonation normal. No stridor present. No tracheal deviation present.   Normal range of motion.  Cardiovascular: Normal rate, regular rhythm and normal heart sounds. Exam reveals no friction rub.    No murmur heard.  Pulses:       Radial pulses are 2+ on the right side and 2+ on the left side.        Dorsalis pedis pulses are 2+ on the right side and 2+ on the left side.   Abdominal: Abdomen is soft. There is no abdominal tenderness.   No CVA tenderness. Abdomen benign. There is no rebound and no guarding.   Musculoskeletal:      Cervical back: Normal range of motion.     Neurological: She is alert and oriented to person, place, and time. She has normal strength. GCS eye subscore is 4. GCS verbal subscore is 5. GCS motor subscore is 6.   Skin: Skin is warm.   Psychiatric: She has a normal mood and affect. Her speech is normal and behavior is normal. Judgment and thought content normal. Cognition and memory are normal.         ED Course   Procedures  Labs Reviewed   URINALYSIS, REFLEX TO URINE CULTURE - Abnormal; Notable for the following components:       Result Value    Occult Blood UA 2+ (*)     All other components within normal limits    Narrative:     Specimen Source->Urine   URINALYSIS MICROSCOPIC - Abnormal; Notable for the following components:    RBC, UA 8 (*)     Bacteria Few (*)     All other components  within normal limits    Narrative:     Specimen Source->Urine   POCT URINE PREGNANCY          Imaging Results    None          Medications - No data to display  Medical Decision Making:   History:   Old Medical Records: I decided to obtain old medical records.  Clinical Tests:   Lab Tests: Ordered and Reviewed    Additional MDM:   Comments: 37-year-old female presents complaining of atypical pelvic pain which she describes as the pulling sensation of her lower abdominal and tingling sensation.  Abdominal exam was benign.  UPT negative.  She is currently being treated for urinary tract infection and on day 2 of 3.  Urinalysis repeated and without evidence of UTI now.  She was encouraged to complete her last dose of Bactrim and follow up with her gyn for re-evaluation symptoms persist..        Scribe Attestation:   Scribe #1: I performed the above scribed service and the documentation accurately describes the services I performed. I attest to the accuracy of the note.                Physician Attestation for Scribe: I, Ivone Sher  , reviewed documentation as scribed in my presence, which is both accurate and complete.      Clinical Impression:   Final diagnoses:  [R10.2] Pelvic pain in female (Primary)  [N30.00] Acute cystitis without hematuria          ED Disposition Condition    Discharge Stable        ED Prescriptions     None        Follow-up Information     Follow up With Specialties Details Why Contact Info    Denice Nolan MD Internal Medicine Schedule an appointment as soon as possible for a visit  As needed if symptoms persist after completion of your antibiotic [address]  [address]  [place] [address]  [phone]             Ivone Sher MD  05/03/22 0062

## 2023-04-09 ENCOUNTER — HOSPITAL ENCOUNTER (EMERGENCY)
Facility: OTHER | Age: 38
Discharge: HOME | End: 2023-04-09
Attending: EMERGENCY MEDICINE
Payer: MEDICAID

## 2023-04-09 VITALS
RESPIRATION RATE: 18 BRPM | BODY MASS INDEX: 35.16 KG/M2 | TEMPERATURE: 100 F | HEART RATE: 85 BPM | OXYGEN SATURATION: 99 % | SYSTOLIC BLOOD PRESSURE: 123 MMHG | HEIGHT: 65 IN | WEIGHT: 211 LBS | DIASTOLIC BLOOD PRESSURE: 69 MMHG

## 2023-04-09 DIAGNOSIS — N30.01 ACUTE CYSTITIS WITH HEMATURIA: Primary | ICD-10-CM

## 2023-04-09 DIAGNOSIS — K59.00 CONSTIPATION: ICD-10-CM

## 2023-04-09 DIAGNOSIS — B37.31 VAGINAL CANDIDIASIS: ICD-10-CM

## 2023-04-09 LAB
B-HCG UR QL: NEGATIVE
BACTERIA #/AREA URNS HPF: ABNORMAL /HPF
BACTERIA GENITAL QL WET PREP: ABNORMAL
BILIRUB UR QL STRIP: NEGATIVE
CLARITY UR: ABNORMAL
CLUE CELLS VAG QL WET PREP: ABNORMAL
COLOR UR: YELLOW
CTP QC/QA: YES
FILAMENT FUNGI VAG WET PREP-#/AREA: ABNORMAL
GLUCOSE UR QL STRIP: NEGATIVE
HGB UR QL STRIP: ABNORMAL
HYALINE CASTS #/AREA URNS LPF: 0 /LPF
KETONES UR QL STRIP: NEGATIVE
LEUKOCYTE ESTERASE UR QL STRIP: ABNORMAL
MICROSCOPIC COMMENT: ABNORMAL
NITRITE UR QL STRIP: POSITIVE
PH UR STRIP: 6 [PH] (ref 5–8)
PROT UR QL STRIP: ABNORMAL
RBC #/AREA URNS HPF: 13 /HPF (ref 0–4)
SP GR UR STRIP: 1.03 (ref 1–1.03)
SPECIMEN SOURCE: ABNORMAL
SQUAMOUS #/AREA URNS HPF: 25 /HPF
T VAGINALIS GENITAL QL WET PREP: ABNORMAL
URN SPEC COLLECT METH UR: ABNORMAL
UROBILINOGEN UR STRIP-ACNC: ABNORMAL EU/DL
WBC #/AREA URNS HPF: 20 /HPF (ref 0–5)
WBC #/AREA VAG WET PREP: ABNORMAL
YEAST GENITAL QL WET PREP: ABNORMAL
YEAST URNS QL MICRO: ABNORMAL

## 2023-04-09 PROCEDURE — 87186 SC STD MICRODIL/AGAR DIL: CPT

## 2023-04-09 PROCEDURE — 87591 N.GONORRHOEAE DNA AMP PROB: CPT

## 2023-04-09 PROCEDURE — 87210 SMEAR WET MOUNT SALINE/INK: CPT

## 2023-04-09 PROCEDURE — 87088 URINE BACTERIA CULTURE: CPT

## 2023-04-09 PROCEDURE — 87086 URINE CULTURE/COLONY COUNT: CPT

## 2023-04-09 PROCEDURE — 25000003 PHARM REV CODE 250

## 2023-04-09 PROCEDURE — 81025 URINE PREGNANCY TEST: CPT

## 2023-04-09 PROCEDURE — 81000 URINALYSIS NONAUTO W/SCOPE: CPT

## 2023-04-09 PROCEDURE — 87077 CULTURE AEROBIC IDENTIFY: CPT

## 2023-04-09 PROCEDURE — 99284 EMERGENCY DEPT VISIT MOD MDM: CPT

## 2023-04-09 RX ORDER — MAG HYDROX/ALUMINUM HYD/SIMETH 200-200-20
5 SUSPENSION, ORAL (FINAL DOSE FORM) ORAL
Status: COMPLETED | OUTPATIENT
Start: 2023-04-09 | End: 2023-04-09

## 2023-04-09 RX ORDER — ONDANSETRON 4 MG/1
4 TABLET, ORALLY DISINTEGRATING ORAL EVERY 8 HOURS PRN
Qty: 30 TABLET | Refills: 0 | Status: SHIPPED | OUTPATIENT
Start: 2023-04-09

## 2023-04-09 RX ORDER — METRONIDAZOLE 500 MG/1
500 TABLET ORAL
Status: COMPLETED | OUTPATIENT
Start: 2023-04-09 | End: 2023-04-09

## 2023-04-09 RX ORDER — PHENAZOPYRIDINE HYDROCHLORIDE 200 MG/1
200 TABLET, FILM COATED ORAL 3 TIMES DAILY
Qty: 6 TABLET | Refills: 0 | Status: SHIPPED | OUTPATIENT
Start: 2023-04-09 | End: 2023-04-11

## 2023-04-09 RX ORDER — SENNOSIDES 8.6 MG/1
8.6 TABLET ORAL DAILY PRN
Status: DISCONTINUED | OUTPATIENT
Start: 2023-04-09 | End: 2023-04-09 | Stop reason: HOSPADM

## 2023-04-09 RX ORDER — PHENAZOPYRIDINE HYDROCHLORIDE 200 MG/1
200 TABLET, FILM COATED ORAL
Status: COMPLETED | OUTPATIENT
Start: 2023-04-09 | End: 2023-04-09

## 2023-04-09 RX ORDER — ACETAMINOPHEN 500 MG
1000 TABLET ORAL
Status: COMPLETED | OUTPATIENT
Start: 2023-04-09 | End: 2023-04-09

## 2023-04-09 RX ORDER — NITROFURANTOIN 25; 75 MG/1; MG/1
100 CAPSULE ORAL 2 TIMES DAILY
Qty: 10 CAPSULE | Refills: 0 | Status: SHIPPED | OUTPATIENT
Start: 2023-04-10 | End: 2023-04-15

## 2023-04-09 RX ORDER — FLUCONAZOLE 100 MG/1
100 TABLET ORAL
Status: DISCONTINUED | OUTPATIENT
Start: 2023-04-09 | End: 2023-04-09

## 2023-04-09 RX ORDER — NITROFURANTOIN 25; 75 MG/1; MG/1
100 CAPSULE ORAL
Status: COMPLETED | OUTPATIENT
Start: 2023-04-09 | End: 2023-04-09

## 2023-04-09 RX ORDER — METRONIDAZOLE 500 MG/1
500 TABLET ORAL EVERY 12 HOURS
Qty: 14 TABLET | Refills: 0 | Status: SHIPPED | OUTPATIENT
Start: 2023-04-09 | End: 2023-04-16

## 2023-04-09 RX ORDER — ONDANSETRON 4 MG/1
4 TABLET, ORALLY DISINTEGRATING ORAL
Status: COMPLETED | OUTPATIENT
Start: 2023-04-09 | End: 2023-04-09

## 2023-04-09 RX ORDER — FLUCONAZOLE 150 MG/1
150 TABLET ORAL
Status: COMPLETED | OUTPATIENT
Start: 2023-04-09 | End: 2023-04-09

## 2023-04-09 RX ADMIN — ONDANSETRON 4 MG: 4 TABLET, ORALLY DISINTEGRATING ORAL at 04:04

## 2023-04-09 RX ADMIN — METRONIDAZOLE 500 MG: 500 TABLET ORAL at 06:04

## 2023-04-09 RX ADMIN — ACETAMINOPHEN 1000 MG: 500 TABLET, FILM COATED ORAL at 05:04

## 2023-04-09 RX ADMIN — FLUCONAZOLE 150 MG: 150 TABLET ORAL at 04:04

## 2023-04-09 RX ADMIN — NITROFURANTOIN MONOHYDRATE/MACROCRYSTALS 100 MG: 25; 75 CAPSULE ORAL at 04:04

## 2023-04-09 RX ADMIN — SENNOSIDES 8.6 MG: 8.6 TABLET, FILM COATED ORAL at 04:04

## 2023-04-09 RX ADMIN — PHENAZOPYRIDINE 200 MG: 200 TABLET ORAL at 04:04

## 2023-04-09 RX ADMIN — ALUMINUM HYDROXIDE, MAGNESIUM HYDROXIDE, AND SIMETHICONE 5 ML: 200; 200; 20 SUSPENSION ORAL at 03:04

## 2023-04-09 NOTE — DISCHARGE INSTRUCTIONS
You were treated with a 1 time dose of Diflucan for your vaginal yeast infection.  Please follow-up with your Ob/gyn if you continue to have symptoms as you may need additional treatment.    Please see the attached instructions for things you can do at home to help her symptoms of your urinary tract infection and ways you can prevent them from happening in the future.    You can continue to take laxatives and stool softeners for the next few days to help with constipation.  I have sent in a prescription for Zofran to help with the nausea that you are experiencing.    Please return to the ER if you experience any worrisome symptoms such as fever, chills, increased abdominal pain, significant vomiting.

## 2023-04-09 NOTE — ED TRIAGE NOTES
"Pt presents to ED c/o generalized abd pain that she describes as "pulling." With nausea and dec. Appetite. Pt states she was having constipation and took a laxative, had a BM but reports minimal relief from abd pain. Denies fever at home, vomiting.  "

## 2023-04-09 NOTE — ED PROVIDER NOTES
"Encounter Date: 4/9/2023       History     Chief Complaint   Patient presents with    Abdominal Pain     Pt reports generalized "pulling pain" in abdomen x 4 days with nausea, loss of appetite, and constipation, no relief from otc laxative; pt also reporting chills and body aches     This is a 38-year-old female with anemia who presents to the ED with complaints of lower abdominal pain x4 days.  Associated symptoms include nausea, loss of appetite, and chills.  Additionally, patient states that she has been constipated and has only gotten limited relief from over-the-counter laxatives. Describes the pain as a "pulling." Denies fever, hematemesis, chest pain, shortness of breath.      Review of patient's allergies indicates:   Allergen Reactions    Asa [aspirin]      Past Medical History:   Diagnosis Date    Migraine headache      History reviewed. No pertinent surgical history.  History reviewed. No pertinent family history.  Social History     Tobacco Use    Smoking status: Never    Smokeless tobacco: Never   Substance Use Topics    Alcohol use: No    Drug use: No     Review of Systems   Constitutional:  Negative for chills and fever.   HENT:  Negative for congestion, sinus pressure and sore throat.    Eyes:  Negative for pain.   Respiratory:  Negative for cough and shortness of breath.    Cardiovascular:  Negative for chest pain.   Gastrointestinal:  Positive for abdominal pain and constipation. Negative for diarrhea, nausea and vomiting.   Genitourinary:  Negative for dysuria and hematuria.   Musculoskeletal:  Negative for arthralgias and myalgias.   Skin: Negative.    Neurological:  Negative for weakness, numbness and headaches.   Hematological: Negative.    Psychiatric/Behavioral:  Negative for agitation and confusion.      Physical Exam     Initial Vitals   BP Pulse Resp Temp SpO2   04/09/23 1457 04/09/23 1457 04/09/23 1457 04/09/23 1457 04/09/23 1652   128/78 99 20 99.3 °F (37.4 °C) 99 %      MAP       --     "            Physical Exam    Constitutional: She appears well-developed and well-nourished.  Non-toxic appearance. She does not appear ill. No distress.   HENT:   Head: Normocephalic and atraumatic.   Eyes: Conjunctivae are normal.   Neck: Neck supple.   Cardiovascular:  Normal rate and regular rhythm.           Pulmonary/Chest: Effort normal. No tachypnea. No respiratory distress.   Abdominal: Abdomen is soft. Bowel sounds are normal. There is abdominal tenderness (Suprapubic). There is no rebound and no guarding.   Musculoskeletal:      Cervical back: Neck supple.     Neurological: She is alert and oriented to person, place, and time. GCS eye subscore is 4. GCS verbal subscore is 5. GCS motor subscore is 6.   Skin: Skin is warm, dry and intact.   Psychiatric: She has a normal mood and affect. Her speech is normal and behavior is normal.       ED Course   Procedures  Labs Reviewed   VAGINAL SCREEN - Abnormal; Notable for the following components:       Result Value    Clue Cells Rare (*)     WBC - Vaginal Screen Few (*)     Bacteria - Vaginal Screen Few (*)     All other components within normal limits    Narrative:     Release to patient->Immediate   URINALYSIS, REFLEX TO URINE CULTURE - Abnormal; Notable for the following components:    Appearance, UA Hazy (*)     Protein, UA 1+ (*)     Occult Blood UA 2+ (*)     Nitrite, UA Positive (*)     Urobilinogen, UA 2.0-3.0 (*)     Leukocytes, UA 2+ (*)     All other components within normal limits    Narrative:     Specimen Source->Urine   URINALYSIS MICROSCOPIC - Abnormal; Notable for the following components:    RBC, UA 13 (*)     WBC, UA 20 (*)     Bacteria Many (*)     Yeast, UA Occasional (*)     All other components within normal limits    Narrative:     Specimen Source->Urine   CULTURE, URINE   C. TRACHOMATIS/N. GONORRHOEAE BY AMP DNA   POCT URINE PREGNANCY          Imaging Results    None          Medications   aluminum-magnesium hydroxide-simethicone 200-200-20  mg/5 mL suspension 5 mL (5 mLs Oral Given 4/9/23 1553)   ondansetron disintegrating tablet 4 mg (4 mg Oral Given 4/9/23 1617)   nitrofurantoin (macrocrystal-monohydrate) 100 MG capsule 100 mg (100 mg Oral Given 4/9/23 1650)   phenazopyridine tablet 200 mg (200 mg Oral Given 4/9/23 1650)   fluconazole tablet 150 mg (150 mg Oral Given 4/9/23 1650)   acetaminophen tablet 1,000 mg (1,000 mg Oral Given 4/9/23 1722)   metroNIDAZOLE tablet 500 mg (500 mg Oral Given 4/9/23 1820)     Medical Decision Making:   Initial Assessment:   Evaluation of a 38-year-old female with constipation and lower abdominal pain.  Patient last used laxative yesterday with some resulting loose bowel movement but has continued having some abdominal bloating and discomfort.  Patient is without focal abdominal tenderness, low suspicion for acute emergent intra abdominal process and do not feel that CT scan is necessary at this time.  Patient is requesting imaging, specifically an ultrasound.  I discussed with her that an ultrasound is not an appropriate imaging modality given her symptoms and physical exam.  I will obtain a KUB to evaluate constipation, but I have very low suspicion for bowel obstruction.  Patient is passing gas and is without any vomiting.  Will give Zofran for nausea, simethicone for bloating, and senna for laxative, and reassess.  Patient is well-appearing, afebrile with vital signs are without evidence of infection or sepsis, I do not feel that further lab workup is necessary at this time.  Will obtain urinalysis and UPT.  Differential Diagnosis:   Constipation, IBS/IBD, UTI, gastroenteritis, pregnancy, PUD, SBO/volvulus,  disorders           ED Course as of 04/09/23 2356   Sun Apr 09, 2023   1638 Leukocytes, UA(!): 2+ [STEPHEN]   1638 NITRITE UA(!): Positive [STEPHEN]   1638 Bacteria, UA(!): Many [STEPHEN]   1638 Yeast, UA(!): Occasional [STEPHEN]   1646 UA with evidence of nitrite positive UTI and yeast.  Further questioning reveals white and  clumpy vaginal discharge with irritation and itching consistent with vaginal candidiasis.  Will treat with single dose of Diflucan here.  Will initiate antibiotic therapy with Macrobid here and discharge home with course of 5 days of Macrobid.  Urine culture pending.  Additionally, will obtain vaginal screen and GC/chlamydia. [STEPHEN]   1731 Upon discharge, patient noted to have increased temperature, borderline febrile.  Will treat with 1000 mg Tylenol.  She does not have any CVA tenderness or signs of pyelonephritis. [STEPHEN]      ED Course User Index  [STEPHEN] Alyssa Dolan PA-C                 Clinical Impression:   Final diagnoses:  [K59.00] Constipation  [N30.01] Acute cystitis with hematuria (Primary)  [B37.31] Vaginal candidiasis        ED Disposition Condition    Discharge Stable          ED Prescriptions       Medication Sig Dispense Start Date End Date Auth. Provider    nitrofurantoin, macrocrystal-monohydrate, (MACROBID) 100 MG capsule Take 1 capsule (100 mg total) by mouth 2 (two) times daily. for 5 days 10 capsule 4/10/2023 4/15/2023 Alyssa Dolan PA-C    phenazopyridine (PYRIDIUM) 200 MG tablet Take 1 tablet (200 mg total) by mouth 3 (three) times daily. for 2 days 6 tablet 4/9/2023 4/11/2023 Alyssa Dolan PA-C    ondansetron (ZOFRAN-ODT) 4 MG TbDL Take 1 tablet (4 mg total) by mouth every 8 (eight) hours as needed (for nausea). 30 tablet 4/9/2023 -- Alyssa Dolan PA-C    metroNIDAZOLE (FLAGYL) 500 MG tablet Take 1 tablet (500 mg total) by mouth every 12 (twelve) hours. for 7 days 14 tablet 4/9/2023 4/16/2023 Alyssa Dolan PA-C          Follow-up Information       Follow up With Specialties Details Why Contact Info    [facility] Emergency Dept Emergency Medicine  If symptoms worsen [address]  [facility] [id number] [phone]             Alyssa Dolan PA-C  04/09/23 2707       Alyssa Dolan PA-C  04/09/23 9303

## 2023-04-10 LAB
C TRACH DNA SPEC QL NAA+PROBE: NOT DETECTED
N GONORRHOEA DNA SPEC QL NAA+PROBE: NOT DETECTED

## 2023-04-11 LAB — BACTERIA UR CULT: ABNORMAL

## 2023-07-31 ENCOUNTER — PATIENT MESSAGE (OUTPATIENT)
Dept: RESEARCH | Facility: HOSPITAL | Age: 38
End: 2023-07-31
Payer: MEDICAID

## 2025-07-29 ENCOUNTER — HOSPITAL ENCOUNTER (INPATIENT)
Facility: OTHER | Age: 40
LOS: 2 days | Discharge: HOME | DRG: 379 | End: 2025-07-31
Attending: EMERGENCY MEDICINE | Admitting: EMERGENCY MEDICINE
Payer: MEDICAID

## 2025-07-29 DIAGNOSIS — D62 ACUTE BLOOD LOSS ANEMIA: ICD-10-CM

## 2025-07-29 DIAGNOSIS — R06.00 DYSPNEA: ICD-10-CM

## 2025-07-29 DIAGNOSIS — K92.2 ACUTE GI BLEEDING: ICD-10-CM

## 2025-07-29 DIAGNOSIS — K92.2 GASTROINTESTINAL HEMORRHAGE, UNSPECIFIED GASTROINTESTINAL HEMORRHAGE TYPE: Primary | ICD-10-CM

## 2025-07-29 PROBLEM — K59.09 CHRONIC CONSTIPATION: Status: ACTIVE | Noted: 2025-07-29

## 2025-07-29 PROBLEM — R55 EPISODE OF SYNCOPE: Status: ACTIVE | Noted: 2025-07-29

## 2025-07-29 PROBLEM — K21.9 GERD (GASTROESOPHAGEAL REFLUX DISEASE): Status: ACTIVE | Noted: 2025-07-29

## 2025-07-29 LAB
ABO + RH BLD: NORMAL
ABO + RH BLD: NORMAL
ABORH RETYPE: NORMAL
ABSOLUTE EOSINOPHIL (OHS): 0.01 K/UL
ABSOLUTE EOSINOPHIL (OHS): 0.02 K/UL
ABSOLUTE MONOCYTE (OHS): 0.59 K/UL (ref 0.3–1)
ABSOLUTE MONOCYTE (OHS): 0.82 K/UL (ref 0.3–1)
ABSOLUTE NEUTROPHIL COUNT (OHS): 5.28 K/UL (ref 1.8–7.7)
ABSOLUTE NEUTROPHIL COUNT (OHS): 7.51 K/UL (ref 1.8–7.7)
ALBUMIN SERPL BCP-MCNC: 4.1 G/DL (ref 3.5–5.2)
ALP SERPL-CCNC: 61 UNIT/L (ref 40–150)
ALT SERPL W/O P-5'-P-CCNC: 10 UNIT/L (ref 10–44)
ANION GAP (OHS): 7 MMOL/L (ref 8–16)
AST SERPL-CCNC: 15 UNIT/L (ref 11–45)
B-HCG UR QL: NEGATIVE
BASOPHILS # BLD AUTO: 0.03 K/UL
BASOPHILS # BLD AUTO: 0.04 K/UL
BASOPHILS NFR BLD AUTO: 0.3 %
BASOPHILS NFR BLD AUTO: 0.5 %
BILIRUB SERPL-MCNC: 0.2 MG/DL (ref 0.1–1)
BILIRUB UR QL STRIP.AUTO: NEGATIVE
BLD PROD TYP BPU: NORMAL
BLD PROD TYP BPU: NORMAL
BLOOD UNIT EXPIRATION DATE: NORMAL
BLOOD UNIT EXPIRATION DATE: NORMAL
BLOOD UNIT TYPE CODE: 7300
BLOOD UNIT TYPE CODE: 7300
BUN SERPL-MCNC: 22 MG/DL (ref 6–20)
CALCIUM SERPL-MCNC: 8.6 MG/DL (ref 8.7–10.5)
CHLORIDE SERPL-SCNC: 109 MMOL/L (ref 95–110)
CLARITY UR: CLEAR
CO2 SERPL-SCNC: 21 MMOL/L (ref 23–29)
COLOR UR AUTO: YELLOW
CREAT SERPL-MCNC: 0.6 MG/DL (ref 0.5–1.4)
CREAT SERPL-MCNC: 0.6 MG/DL (ref 0.5–1.4)
CROSSMATCH INTERPRETATION: NORMAL
CROSSMATCH INTERPRETATION: NORMAL
CTP QC/QA: YES
DISPENSE STATUS: NORMAL
DISPENSE STATUS: NORMAL
ERYTHROCYTE [DISTWIDTH] IN BLOOD BY AUTOMATED COUNT: 14.3 % (ref 11.5–14.5)
ERYTHROCYTE [DISTWIDTH] IN BLOOD BY AUTOMATED COUNT: 14.4 % (ref 11.5–14.5)
GFR SERPLBLD CREATININE-BSD FMLA CKD-EPI: >60 ML/MIN/1.73/M2
GLUCOSE SERPL-MCNC: 100 MG/DL (ref 70–110)
GLUCOSE UR QL STRIP: NEGATIVE
HCT VFR BLD AUTO: 18.5 % (ref 37–48.5)
HCT VFR BLD AUTO: 22.4 % (ref 37–48.5)
HCT VFR BLD CALC: 19 %PCV (ref 36–54)
HCV AB SERPL QL IA: NORMAL
HGB BLD-MCNC: 6.2 GM/DL (ref 12–16)
HGB BLD-MCNC: 7 G/DL
HGB BLD-MCNC: 7.5 GM/DL (ref 12–16)
HGB UR QL STRIP: NEGATIVE
HIV 1+2 AB+HIV1 P24 AG SERPL QL IA: NORMAL
HOLD SPECIMEN: 0
IMM GRANULOCYTES # BLD AUTO: 0.03 K/UL (ref 0–0.04)
IMM GRANULOCYTES # BLD AUTO: 0.06 K/UL (ref 0–0.04)
IMM GRANULOCYTES NFR BLD AUTO: 0.4 % (ref 0–0.5)
IMM GRANULOCYTES NFR BLD AUTO: 0.6 % (ref 0–0.5)
INDIRECT COOMBS: NORMAL
KETONES UR QL STRIP: NEGATIVE
LEUKOCYTE ESTERASE UR QL STRIP: NEGATIVE
LYMPHOCYTES # BLD AUTO: 1.69 K/UL (ref 1–4.8)
LYMPHOCYTES # BLD AUTO: 1.99 K/UL (ref 1–4.8)
MCH RBC QN AUTO: 30.9 PG (ref 27–31)
MCH RBC QN AUTO: 31 PG (ref 27–31)
MCHC RBC AUTO-ENTMCNC: 33.5 G/DL (ref 32–36)
MCHC RBC AUTO-ENTMCNC: 33.5 G/DL (ref 32–36)
MCV RBC AUTO: 92 FL (ref 82–98)
MCV RBC AUTO: 93 FL (ref 82–98)
NITRITE UR QL STRIP: NEGATIVE
NUCLEATED RBC (/100WBC) (OHS): 0 /100 WBC
NUCLEATED RBC (/100WBC) (OHS): 0 /100 WBC
PH UR STRIP: 7 [PH]
PLATELET # BLD AUTO: 203 K/UL (ref 150–450)
PLATELET # BLD AUTO: 238 K/UL (ref 150–450)
PMV BLD AUTO: 9.7 FL (ref 9.2–12.9)
PMV BLD AUTO: 9.7 FL (ref 9.2–12.9)
POC IONIZED CALCIUM: 1.18 MMOL/L (ref 1.06–1.42)
POC MOLECULAR INFLUENZA A AGN: NEGATIVE
POC MOLECULAR INFLUENZA B AGN: NEGATIVE
POCT GLUCOSE: 91 MG/DL (ref 70–110)
POTASSIUM BLD-SCNC: 3.8 MMOL/L (ref 3.5–5.1)
POTASSIUM SERPL-SCNC: 3.9 MMOL/L (ref 3.5–5.1)
PROT SERPL-MCNC: 6.7 GM/DL (ref 6–8.4)
PROT UR QL STRIP: NEGATIVE
RBC # BLD AUTO: 2 M/UL (ref 4–5.4)
RBC # BLD AUTO: 2.43 M/UL (ref 4–5.4)
RELATIVE EOSINOPHIL (OHS): 0.1 %
RELATIVE EOSINOPHIL (OHS): 0.2 %
RELATIVE LYMPHOCYTE (OHS): 19.1 % (ref 18–48)
RELATIVE LYMPHOCYTE (OHS): 22.1 % (ref 18–48)
RELATIVE MONOCYTE (OHS): 7.7 % (ref 4–15)
RELATIVE MONOCYTE (OHS): 7.9 % (ref 4–15)
RELATIVE NEUTROPHIL (OHS): 69.2 % (ref 38–73)
RELATIVE NEUTROPHIL (OHS): 71.9 % (ref 38–73)
RH BLD: NORMAL
SAMPLE: ABNORMAL
SAMPLE: NORMAL
SARS-COV-2 RDRP RESP QL NAA+PROBE: NEGATIVE
SODIUM BLD-SCNC: 139 MMOL/L (ref 136–145)
SODIUM SERPL-SCNC: 137 MMOL/L (ref 136–145)
SP GR UR STRIP: 1.02
SPECIMEN OUTDATE: NORMAL
TSH SERPL-ACNC: 0.83 UIU/ML (ref 0.4–4)
UNIT NUMBER: NORMAL
UNIT NUMBER: NORMAL
UROBILINOGEN UR STRIP-ACNC: NEGATIVE EU/DL
WBC # BLD AUTO: 10.43 K/UL (ref 3.9–12.7)
WBC # BLD AUTO: 7.64 K/UL (ref 3.9–12.7)

## 2025-07-29 PROCEDURE — 86901 BLOOD TYPING SEROLOGIC RH(D): CPT | Performed by: EMERGENCY MEDICINE

## 2025-07-29 PROCEDURE — P9016 RBC LEUKOCYTES REDUCED: HCPCS | Performed by: EMERGENCY MEDICINE

## 2025-07-29 PROCEDURE — 87389 HIV-1 AG W/HIV-1&-2 AB AG IA: CPT | Performed by: NURSE PRACTITIONER

## 2025-07-29 PROCEDURE — 11000001 HC ACUTE MED/SURG PRIVATE ROOM

## 2025-07-29 PROCEDURE — 81025 URINE PREGNANCY TEST: CPT | Performed by: NURSE PRACTITIONER

## 2025-07-29 PROCEDURE — 63600175 PHARM REV CODE 636 W HCPCS: Performed by: NURSE PRACTITIONER

## 2025-07-29 PROCEDURE — P9016 RBC LEUKOCYTES REDUCED: HCPCS | Performed by: NURSE PRACTITIONER

## 2025-07-29 PROCEDURE — 96361 HYDRATE IV INFUSION ADD-ON: CPT

## 2025-07-29 PROCEDURE — 96374 THER/PROPH/DIAG INJ IV PUSH: CPT

## 2025-07-29 PROCEDURE — 86920 COMPATIBILITY TEST SPIN: CPT | Performed by: EMERGENCY MEDICINE

## 2025-07-29 PROCEDURE — 25000003 PHARM REV CODE 250: Performed by: EMERGENCY MEDICINE

## 2025-07-29 PROCEDURE — 86803 HEPATITIS C AB TEST: CPT | Performed by: NURSE PRACTITIONER

## 2025-07-29 PROCEDURE — 36430 TRANSFUSION BLD/BLD COMPNT: CPT | Performed by: NURSE PRACTITIONER

## 2025-07-29 PROCEDURE — 84155 ASSAY OF PROTEIN SERUM: CPT | Performed by: NURSE PRACTITIONER

## 2025-07-29 PROCEDURE — 21400001 HC TELEMETRY ROOM

## 2025-07-29 PROCEDURE — 87635 SARS-COV-2 COVID-19 AMP PRB: CPT | Performed by: NURSE PRACTITIONER

## 2025-07-29 PROCEDURE — 81003 URINALYSIS AUTO W/O SCOPE: CPT | Performed by: NURSE PRACTITIONER

## 2025-07-29 PROCEDURE — 85025 COMPLETE CBC W/AUTO DIFF WBC: CPT | Performed by: NURSE PRACTITIONER

## 2025-07-29 PROCEDURE — 25500020 PHARM REV CODE 255: Performed by: EMERGENCY MEDICINE

## 2025-07-29 PROCEDURE — 99285 EMERGENCY DEPT VISIT HI MDM: CPT | Mod: 25

## 2025-07-29 PROCEDURE — 84443 ASSAY THYROID STIM HORMONE: CPT | Performed by: EMERGENCY MEDICINE

## 2025-07-29 PROCEDURE — 30233N1 TRANSFUSION OF NONAUTOLOGOUS RED BLOOD CELLS INTO PERIPHERAL VEIN, PERCUTANEOUS APPROACH: ICD-10-PCS | Performed by: EMERGENCY MEDICINE

## 2025-07-29 PROCEDURE — 86920 COMPATIBILITY TEST SPIN: CPT | Performed by: NURSE PRACTITIONER

## 2025-07-29 PROCEDURE — 25000003 PHARM REV CODE 250: Performed by: NURSE PRACTITIONER

## 2025-07-29 RX ORDER — TOPIRAMATE 50 MG/1
50 TABLET, FILM COATED ORAL 2 TIMES DAILY
COMMUNITY
Start: 2025-05-12

## 2025-07-29 RX ORDER — HYDROCODONE BITARTRATE AND ACETAMINOPHEN 500; 5 MG/1; MG/1
TABLET ORAL
Status: DISCONTINUED | OUTPATIENT
Start: 2025-07-29 | End: 2025-07-31 | Stop reason: HOSPADM

## 2025-07-29 RX ORDER — ONDANSETRON HYDROCHLORIDE 2 MG/ML
8 INJECTION, SOLUTION INTRAVENOUS EVERY 6 HOURS PRN
Status: DISCONTINUED | OUTPATIENT
Start: 2025-07-29 | End: 2025-07-31 | Stop reason: HOSPADM

## 2025-07-29 RX ORDER — SODIUM CHLORIDE 0.9 % (FLUSH) 0.9 %
10 SYRINGE (ML) INJECTION
Status: DISCONTINUED | OUTPATIENT
Start: 2025-07-29 | End: 2025-07-31 | Stop reason: HOSPADM

## 2025-07-29 RX ORDER — MUPIROCIN 20 MG/G
OINTMENT TOPICAL 2 TIMES DAILY
Status: DISCONTINUED | OUTPATIENT
Start: 2025-07-29 | End: 2025-07-31 | Stop reason: HOSPADM

## 2025-07-29 RX ORDER — ONDANSETRON HYDROCHLORIDE 2 MG/ML
4 INJECTION, SOLUTION INTRAVENOUS EVERY 8 HOURS PRN
Status: DISCONTINUED | OUTPATIENT
Start: 2025-07-29 | End: 2025-07-29

## 2025-07-29 RX ORDER — LINACLOTIDE 145 UG/1
145 CAPSULE, GELATIN COATED ORAL
COMMUNITY

## 2025-07-29 RX ORDER — CYCLOBENZAPRINE HCL 10 MG
10 TABLET ORAL NIGHTLY PRN
COMMUNITY

## 2025-07-29 RX ORDER — TRAMADOL HYDROCHLORIDE 50 MG/1
50 TABLET, FILM COATED ORAL
COMMUNITY
Start: 2025-06-23

## 2025-07-29 RX ORDER — ONDANSETRON 8 MG/1
8 TABLET, ORALLY DISINTEGRATING ORAL EVERY 6 HOURS PRN
Status: DISCONTINUED | OUTPATIENT
Start: 2025-07-29 | End: 2025-07-31 | Stop reason: HOSPADM

## 2025-07-29 RX ORDER — ACETAMINOPHEN 325 MG/1
650 TABLET ORAL EVERY 4 HOURS PRN
Status: DISCONTINUED | OUTPATIENT
Start: 2025-07-29 | End: 2025-07-31 | Stop reason: HOSPADM

## 2025-07-29 RX ORDER — SODIUM CHLORIDE 9 MG/ML
INJECTION, SOLUTION INTRAVENOUS CONTINUOUS
Status: DISCONTINUED | OUTPATIENT
Start: 2025-07-29 | End: 2025-07-30

## 2025-07-29 RX ORDER — DIPHENHYDRAMINE HYDROCHLORIDE 50 MG/ML
25 INJECTION, SOLUTION INTRAMUSCULAR; INTRAVENOUS ONCE
Status: COMPLETED | OUTPATIENT
Start: 2025-07-29 | End: 2025-07-29

## 2025-07-29 RX ORDER — PANTOPRAZOLE SODIUM 40 MG/1
40 TABLET, DELAYED RELEASE ORAL EVERY MORNING
Status: ON HOLD | COMMUNITY
End: 2025-07-31

## 2025-07-29 RX ORDER — PANTOPRAZOLE SODIUM 40 MG/10ML
40 INJECTION, POWDER, LYOPHILIZED, FOR SOLUTION INTRAVENOUS 2 TIMES DAILY
Status: DISCONTINUED | OUTPATIENT
Start: 2025-07-29 | End: 2025-07-31 | Stop reason: HOSPADM

## 2025-07-29 RX ORDER — BUTALBITAL, ACETAMINOPHEN AND CAFFEINE 50; 325; 40 MG/1; MG/1; MG/1
1 TABLET ORAL ONCE
Status: COMPLETED | OUTPATIENT
Start: 2025-07-29 | End: 2025-07-29

## 2025-07-29 RX ORDER — TALC
6 POWDER (GRAM) TOPICAL NIGHTLY PRN
Status: DISCONTINUED | OUTPATIENT
Start: 2025-07-29 | End: 2025-07-29

## 2025-07-29 RX ORDER — SUMATRIPTAN SUCCINATE 100 MG/1
TABLET ORAL
COMMUNITY

## 2025-07-29 RX ORDER — TALC
6 POWDER (GRAM) TOPICAL NIGHTLY PRN
Status: DISCONTINUED | OUTPATIENT
Start: 2025-07-29 | End: 2025-07-31 | Stop reason: HOSPADM

## 2025-07-29 RX ADMIN — SODIUM CHLORIDE: 9 INJECTION, SOLUTION INTRAVENOUS at 04:07

## 2025-07-29 RX ADMIN — PANTOPRAZOLE SODIUM 40 MG: 40 INJECTION, POWDER, FOR SOLUTION INTRAVENOUS at 04:07

## 2025-07-29 RX ADMIN — BUTALBITAL, ACETAMINOPHEN, AND CAFFEINE 1 TABLET: 325; 50; 40 TABLET ORAL at 08:07

## 2025-07-29 RX ADMIN — ACETAMINOPHEN 650 MG: 325 TABLET, FILM COATED ORAL at 05:07

## 2025-07-29 RX ADMIN — SODIUM CHLORIDE 1000 ML: 9 INJECTION, SOLUTION INTRAVENOUS at 03:07

## 2025-07-29 RX ADMIN — IOHEXOL 130 ML: 350 INJECTION, SOLUTION INTRAVENOUS at 01:07

## 2025-07-29 RX ADMIN — DIPHENHYDRAMINE HYDROCHLORIDE 25 MG: 50 INJECTION, SOLUTION INTRAMUSCULAR; INTRAVENOUS at 10:07

## 2025-07-29 NOTE — ED NOTES
"LOC: The patient is awake, alert, and oriented to self, place, time, and situation. Pt is calm and cooperative. Affect is appropriate. Speech is appropriate and clear.     APPEARANCE: Patient resting on stretcher; appears fatigued but in no acute distress. Patient is clean and well groomed.    SKIN: The skin is warm and dry; color consistent with ethnicity. Patient has normal skin turgor and moist mucus membranes. Skin intact; no breakdown or bruising noted.     MUSCULOSKELETAL: Patient moving upper and lower extremities without difficulty; denies pain in the extremities or back. Complains of generalized weakness.     RESPIRATORY: Airway is open and patent. Respirations spontaneous, even, easy, and non-labored. Patient has a normal effort and rate.  No accessory muscle use noted. Denies cough. Pt reports feeling as if her breathing is "off".     CARDIAC: Pt is tachycardic with a HR of 100. No peripheral edema noted. No complaints of chest pain.     ABDOMEN: Soft and non tender to palpation. No distention noted. Pt denies abdominal pain; denies nausea, vomiting, diarrhea, or constipation.    NEUROLOGIC: Eyes open spontaneously. Behavior appropriate to situation. Follows commands; facial expression symmetrical. Purposeful motor response noted; normal sensation in all extremities. Pt denies headache; denies lightheadedness or dizziness; denies visual disturbances; denies loss of balance; denies unilateral weakness.      "

## 2025-07-29 NOTE — CONSULTS
"Gastroenterology Consult    7/29/2025 4:45 PM    Patient Name: Carlyn Regan  MRN: [id number]  Admission Date: 7/29/2025  Hospital Length of Stay: 0 days  Code Status: Full Code   Primary Care Physician: Denice Nolan MD  Principal Problem:Acute upper GI bleed  Consulting Physician: Inpatient consult to Gastroenterology  Consult performed by: Sobia Worrell MD  Consult ordered by: Gilma Sanchez NP        Reason for consultation: symptomatic anemia    HPI:  Carlyn Regan is a 40 y.o. female with a history of migraines who presented with symptomatic anemia.  She has felt weak and fatigued over the last 2 days and more short of breath.  She has had black stool x2 days.  No N/V, hematemesis.  No hematochezia.  She denies C/D.  No abdominal pain other than some mild discomfort.  She is not on any blood thinners.  She does use Goody powders and ibuprofen for migraines.  She gets heavy periods - last was about 4 weeks ago.  No prior EGD or colonoscopy.  No family history of CRC.    Past Medical History:   Diagnosis Date    Migraine headache        History reviewed. No pertinent surgical history.    Social History[1]     No family history on file.      Review of patient's allergies indicates:   Allergen Reactions    Asa [aspirin]        Current Medications[2]    Review of Systems   Constitutional:  Positive for malaise/fatigue.   Respiratory:  Positive for shortness of breath.    Gastrointestinal:  Positive for melena.   Neurological:  Positive for weakness.   All other systems reviewed and are negative.      /66   Pulse 82   Temp 98.8 °F (37.1 °C)   Resp 17   Ht 5' 5" (1.651 m)   Wt 83.9 kg (185 lb)   SpO2 100%   Breastfeeding No   BMI 30.79 kg/m²   Physical Exam  Constitutional:       General: She is not in acute distress.     Appearance: Normal appearance.   HENT:      Head: Normocephalic and atraumatic.      Right Ear: External ear normal.      Left Ear: External ear normal.      " Nose: Nose normal. No congestion.      Mouth/Throat:      Mouth: Mucous membranes are moist.      Pharynx: Oropharynx is clear.   Eyes:      General: No scleral icterus.     Extraocular Movements: Extraocular movements intact.      Conjunctiva/sclera: Conjunctivae normal.   Cardiovascular:      Rate and Rhythm: Normal rate and regular rhythm.      Heart sounds: Normal heart sounds. No murmur heard.  Pulmonary:      Effort: Pulmonary effort is normal. No respiratory distress.      Breath sounds: Normal breath sounds. No wheezing.   Abdominal:      General: Bowel sounds are normal. There is no distension.      Palpations: Abdomen is soft.      Tenderness: There is no abdominal tenderness. There is no guarding or rebound.   Musculoskeletal:         General: No deformity.      Right lower leg: No edema.      Left lower leg: No edema.   Skin:     General: Skin is warm and dry.      Findings: No rash.   Neurological:      General: No focal deficit present.      Mental Status: She is alert and oriented to person, place, and time. Mental status is at baseline.      Sensory: No sensory deficit.   Psychiatric:         Mood and Affect: Mood normal.         Thought Content: Thought content normal.         Labs:  Lab Results   Component Value Date/Time    WBC 10.43 07/29/2025 03:58 PM    HGB 6.2 (L) 07/29/2025 03:58 PM    HGB 10.7 (L) 02/16/2016 05:02 AM    HCT 18.5 (LL) 07/29/2025 03:58 PM    HCT 19 (LL) 07/29/2025 01:07 PM     07/29/2025 03:58 PM     (H) 02/16/2016 05:02 AM    MCV 93 07/29/2025 03:58 PM    MCV 89 02/16/2016 05:02 AM     07/29/2025 12:11 PM     05/12/2025 10:03 AM     02/16/2016 05:02 AM    K 3.9 07/29/2025 12:11 PM    K 3.8 05/12/2025 10:03 AM    K 3.8 02/16/2016 05:02 AM     07/29/2025 12:11 PM     02/16/2016 05:02 AM    CO2 21 (L) 07/29/2025 12:11 PM    CO2 25 05/12/2025 10:03 AM    CO2 22 (L) 02/16/2016 05:02 AM    BUN 22 (H) 07/29/2025 12:11 PM    CREATININE 0.6  07/29/2025 12:11 PM     07/29/2025 12:11 PM    GLU 86 05/09/2024 09:23 AM    GLU 97 02/16/2016 05:02 AM    CALCIUM 8.6 (L) 07/29/2025 12:11 PM    CALCIUM 9.6 05/12/2025 10:03 AM    CALCIUM 9.7 02/16/2016 05:02 AM    MG 2.2 02/16/2016 05:02 AM    PHOS 3.5 02/16/2016 05:02 AM    INR 1.1 02/16/2016 05:02 AM    APTT 38.2 (H) 02/16/2016 05:02 AM   ]  Lab Results   Component Value Date/Time    PROT 6.7 07/29/2025 12:11 PM    PROT 8.6 (H) 02/15/2016 03:48 PM    ALBUMIN 4.1 07/29/2025 12:11 PM    ALBUMIN 4.8 05/12/2025 10:03 AM    ALBUMIN 4.3 05/09/2024 09:23 AM    ALBUMIN 3.6 02/15/2016 03:48 PM    BILITOT 0.2 07/29/2025 12:11 PM    BILITOT 0.6 05/12/2025 10:03 AM    BILITOT 0.3 02/15/2016 03:48 PM    AST 15 07/29/2025 12:11 PM    AST 14 05/12/2025 10:03 AM    AST 15 02/15/2016 03:48 PM    ALT 10 07/29/2025 12:11 PM    ALT 11 05/12/2025 10:03 AM    ALT 22 02/15/2016 03:48 PM    ALKPHOS 61 07/29/2025 12:11 PM    ALKPHOS 77 05/09/2024 09:23 AM    ALKPHOS 92 02/15/2016 03:48 PM   ]    Imaging and Procedures:  I personally reviewed the imaging/procedures below.  CTA A/P 7/29/25:  1. No contrast extravasation within the large bowel or elsewhere to indicate site of active gastrointestinal hemorrhage.  2. 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  3. Please see above for several additional findings.    Assessment:  Carlyn Regan is a 40 y.o. female with a history of migraines admitted with symptomatic anemia.  Hgb 6.2. BUN elevated. Suggestive of UGIB 2/2 presumed PUD.    Plan:  - serial H/H and transfuse if Hgb < 7  - avoid NSAIDs  - empiric PPI BID  - NPO after MN for EGD in AM  - ok w/ clear liquids this evening    Sobia Worrell MD         [1]   Social History  Tobacco Use    Smoking status: Never    Smokeless tobacco: Never   Substance Use Topics    Alcohol use: No    Drug use: No   [2]   Current Facility-Administered Medications:     0.9%  NaCl infusion (for blood administration),  , Intravenous, Q24H PRN, Rizwna Gage MD    0.9%  NaCl infusion (for blood administration), , Intravenous, Q24H PRN, Gilma Sanchez NP    0.9% NaCl infusion, , Intravenous, Continuous, Gilma Sanchez, NP, Last Rate: 125 mL/hr at 07/29/25 1644, New Bag at 07/29/25 1644    acetaminophen tablet 650 mg, 650 mg, Oral, Q4H PRN, Gilma Sanchez, NP, 650 mg at 07/29/25 1711    melatonin tablet 6 mg, 6 mg, Oral, Nightly PRN, Gilma Sanchez NP    ondansetron disintegrating tablet 8 mg, 8 mg, Oral, Q6H PRN, Gilma Sanchez NP    ondansetron injection 8 mg, 8 mg, Intravenous, Q6H PRN, Gilma Sanchez NP    pantoprazole injection 40 mg, 40 mg, Intravenous, BID, Gilma Sanchez, PEDRO, 40 mg at 07/29/25 1648    sodium chloride 0.9% flush 10 mL, 10 mL, Intravenous, PRN, Rizwan Gage MD    Current Outpatient Medications:     ibuprofen (ADVIL,MOTRIN) 600 MG tablet, Take 1 tablet (600 mg total) by mouth every 6 (six) hours as needed for Pain., Disp: 20 tablet, Rfl: 0    sumatriptan (IMITREX) 100 MG tablet, Take by mouth., Disp: , Rfl:     topiramate (TOPAMAX) 50 MG tablet, Take 50 mg by mouth 2 (two) times daily., Disp: , Rfl:     traMADoL (ULTRAM) 50 mg tablet, Take 50 mg by mouth., Disp: , Rfl:     cyclobenzaprine (FLEXERIL) 10 MG tablet, Take 10 mg by mouth nightly as needed., Disp: , Rfl:     LINZESS 145 mcg Cap capsule, Take 145 mcg by mouth., Disp: , Rfl:     ondansetron (ZOFRAN-ODT) 4 MG TbDL, Take 1 tablet (4 mg total) by mouth every 8 (eight) hours as needed (for nausea)., Disp: 30 tablet, Rfl: 0    pantoprazole (PROTONIX) 40 MG tablet, Take 40 mg by mouth every morning., Disp: , Rfl:

## 2025-07-29 NOTE — Clinical Note
Diagnosis: Dyspnea [568426]   Reason for IP Medical Treatment  (Clinical interventions that can only be accomplished in the IP setting? ) :: GI bleed, acute blood loss anemia

## 2025-07-29 NOTE — ED PROVIDER NOTES
"  Source of History:  Medical record, patient, patient's partner.      Chief complaint:  Per triage note: "Fatigue (Pt reports malaise and fatigue x2 days, states my breathing is off. Pt 100% on RA and in no obvious distress. )  "    HPI:    Patient presents for evaluation of fatigue, malaise, generalized weakness, shortness of breath for the last 2 days associated with black stool.  She denies any significant abdominal pain.  She denies any fevers, chills, urinary symptoms, cough.  She reports associated easy bruising recently.        ROS:   See HPI for pertinent review of systems        Review of patient's allergies indicates:   Allergen Reactions    Asa [aspirin]     Fioricet [butalbital-acetaminophen-caff] Rash       PMH:  As per HPI and below:  Past Medical History:   Diagnosis Date    Migraine headache        History reviewed. No pertinent surgical history.    Social History[1]    Physical Exam:      Nursing note and vitals reviewed.  /67 (BP Location: Left arm, Patient Position: Lying)   Pulse 84   Temp 98.5 °F (36.9 °C) (Oral)   Resp 16   Ht 5' 5" (1.651 m)   Wt 83.9 kg (185 lb)   SpO2 99%   Breastfeeding No   BMI 30.79 kg/m²     Constitutional:  Week appearance.  No distress.  Eyes: EOMI. No discharge. Anicteric.  HENT:   Neck: Normal range of motion. Neck supple.  Cardiovascular: Normal rate. No murmur, no gallop and no friction rub heard.   Pulmonary/Chest: No respiratory distress. Effort normal. No wheezes, no rales, no rhonchi.   Abdominal: Bowel sounds normal. Soft. No distension and no mass. There is no tenderness. There is no rebound, no guarding, no tenderness at McBurney's point.  Neurological: GCS 15. Alert and oriented to person, place, and time. No gross cranial nerve, light touch or strength deficit. Coordination normal.   Skin: Skin is warm and dry.   EXT: 2+ radial pulses.     Genitourinary: Rectal exam shows no external hemorrhoid, no internal hemorrhoid, no mass and no " tenderness. Black stool. Guaiac positive stool. Appropriate control visualized. Exam chaperoned by tech or RN.         Medical Decision Making / Independent Interpretations / External Records Reviewed:        ED Course as of 07/30/25 1406   Tue Jul 29, 2025   1340 Patient is a 40-year-old female with history of migraines, vague GERD history (not on medication) who presents for evaluation of black stools, generalized weakness, fatigue for the past 2 days.  She denies any fevers, chills, travel, suspicious intake, sick contacts, chest pain.  She does note some dyspnea.  She was describes orthostasis.  The initial differential included acute blood loss anemia, GI bleed, aplastic anemia, hemolytic anemia, acute viral syndrome, mesenteric ischemia. [RC]   1426 I independently interpreted labs which are notable for hemoglobin 7.5, likely due to acute blood loss anemia.   [RC]   1441 I independently interpreted CXR which shows no pneumothorax, no focal consolidation, no cardiomegaly, no acute process.   [RC]   1450 Patient history, findings, results, patient management discussed with GI specialist Dr. Worrell. She agrees with plan to admit to hospitalist.     [RC]   1525 Patient has a true medical need for observation/admission.   I have discussed the patient history, exam, findings with hospitalist Dr. Butler, who accepts the patient for Dr. Mejía.      =====================================  Critical Care:  40 minutes total critical care time was personally spent by me, exclusive of procedures and separately billable time.   Critical care was necessary to treat or prevent imminent or life-threatening deterioration of the following conditions:  Acute blood loss anemia   =====================================       [RC]   1600 I was called emergently to the bedside after patient had syncopal episode after walking to/from the bathroom. She required assisatance transferring from wheelchair to stretcher, had no tachycardia,  no hypotension.   Hospitalist team updated, pt upgraded to ICU level of care.   I spent a 10 min of critical care time in addition to previously documented.  [RC]      ED Course User Index  [RC] Rizwan Gage MD              Procedures      Medications   0.9%  NaCl infusion (for blood administration) (has no administration in time range)   sodium chloride 0.9% flush 10 mL (has no administration in time range)   pantoprazole injection 40 mg (40 mg Intravenous Given 7/30/25 0912)   acetaminophen tablet 650 mg (650 mg Oral Given 7/30/25 1219)   ondansetron disintegrating tablet 8 mg (has no administration in time range)   ondansetron injection 8 mg (has no administration in time range)   melatonin tablet 6 mg (has no administration in time range)   0.9%  NaCl infusion (for blood administration) (has no administration in time range)   mupirocin 2 % ointment ( Nasal Given 7/30/25 0912)   lactated ringers infusion ( Intravenous New Bag 7/30/25 0619)   polyethylene glycol (GoLYTELY) solution (has no administration in time range)   iohexoL (OMNIPAQUE 350) injection 130 mL (130 mLs Intravenous Given 7/29/25 1351)   sodium chloride 0.9% bolus 1,000 mL 1,000 mL (0 mLs Intravenous Stopped 7/29/25 1608)   butalbital-acetaminophen-caffeine -40 mg per tablet 1 tablet (1 tablet Oral Given 7/29/25 2048)   diphenhydrAMINE injection 25 mg (25 mg Intravenous Given 7/29/25 2217)       --  I decided to obtain the patient's medical records. I reviewed patient's prior external notes / results:  specialist documentation   and pharmacy / prescription records.   --  Additional Medical Decision Making: Prescription drug management, Drug therapy requiring intensive monitoring for toxicity given or considered, and Hospitalization or escalation of care considered      Launch MDCalc MDM  MDCalc MDM Module  Jul 29 2025 3:27 PM [Rizwan Gage]  Data:  - Discussed with external professional: Case discussed with Admitting Provider or a  provider/trainee on their team. See MDM section and/or ED Course for additional details on the discussion.  - Independent interpretation: I independently reviewed the CTA Abd ves+Pelvis ves WO+W contr IV. See MDM section and/or ED Course for my interpretation. [Rizwan Gage]  - Test/documents/historian: 3+ tests ordered  Problems: Acute blood loss anemia, GI bleed (high)  Additional encounter diagnoses: Dyspnea  Risk: Admitted (Decision regarding hospitalization)             No future appointments.       Diagnostic Impression:    1. Gastrointestinal hemorrhage, unspecified gastrointestinal hemorrhage type    2. Dyspnea    3. Acute blood loss anemia    4. Acute GI bleeding             ED Disposition Condition    Admit                     Rizwan Gage MD  07/29/25 1527         [1]   Social History  Tobacco Use    Smoking status: Never    Smokeless tobacco: Never   Substance Use Topics    Alcohol use: No    Drug use: No        Rizwan Gage MD  07/30/25 [id number]

## 2025-07-29 NOTE — ASSESSMENT & PLAN NOTE
-admitted due to acute suspected U-GIB with symptomatic anemia likely related to aspirin containing powder use with history of GERD and chronic constipation  -at admission HDS and afebrile  -ED workup detailed above  -GIB pathway initiated  -GI consulted  -pending 2 units PRBC transfusion >>> additional units pending H/H trending  -serial H/H monitoring  -begin IV PPI BID per pathway  -continue IVF hydration   -NPO status   -orthostatics qshift  -strict I&Os  -PRN supportive care as indicated   -hold home linzess  -monitor for development of constipation   -trend labs, address/replete electrolytes and transfuse as indicated  -fall precautions

## 2025-07-29 NOTE — PHARMACY MED REC
"Admission Medication History     The home medication history was taken by Sayda Regan.    You may go to "Admission" then "Reconcile Home Medications" tabs to review and/or act upon these items.     The home medication list has been updated by the Pharmacy department.   Please read ALL comments highlighted in yellow.   Please address this information as you see fit.    Feel free to contact us if you have any questions or require assistance.      The PATIENT WAS ABLE TO VERBALLY VERIFY MEDICATIONS AT BEDSIDE             .        "

## 2025-07-29 NOTE — H&P
[facility] Medicine  History & Physical    Patient Name: Carlyn Regan  MRN: [id number]  Patient Class: IP- Inpatient  Admission Date: 7/29/2025  Attending Physician: Rizwan Gage MD   Primary Care Provider: Denice Nolan MD    Patient information was obtained from patient, spouse/SO, past medical records, and ER records.     Subjective:     Principal Problem:Acute upper GI bleed    Chief Complaint:   Chief Complaint   Patient presents with    Fatigue     Pt reports malaise and fatigue x2 days, states my breathing is off. Pt 100% on RA and in no obvious distress.         HPI: Ms. Regan is a 40 YOF with PMHx of migraines, recurrent UTIs, GERD, and chronic intermittent constipation. She presents to ED due to complaints of SOB/ALONSO, chest pain, palpitations, weakness/fatigue, light headiness, dizziness, nausea, headaches, decreased appetite, and black stools for the last two days. She denies history of GIB or bloody/dark stools. She admits to frequent use of Goody's Powder (every other day or daily at times) and intermittent but more use of ibuprofen. She also notes only taking home pantoprazole as needed but not routinely.     She denies fever, chills, recent illness, changes in menstrual cycles or increased menstrual bleeding, abdominal pain, vomiting, diarrhea, constipation, urinary symptoms or hematuria, or decreased UOP. She lives with [facility], is independent in ADLs, and denies ambulatory aide use. She denies tobacco, marijuana, alcohol, or illicit drug use.     In the ED she is HDS and afebrile, however did have near syncopal episode after ambulating to the bathroom. Her fiance was nearby at the time and notes that she seemed to almost fall back after standing from toilet however did not have LOC or fall/injury. CBC with WBC 7.6, H/H 7.5/22.4 with repeat 6.2/18.5, platelets 238. Chemistry with , K 3.9, calcium 8.6, chloride 109, CO2 21, BUN 22, SCr 0.6, glucose 100.  LFTs unremarkable. TSH 0.828. Hep C and HIV non reactive. Urine hCG negative. Flu and covid negative. UA noninfectious. CXR without acute cardiopulmonary proces    CTA GIB protocol:  1. No contrast extravasation within the large bowel or elsewhere to indicate site of active gastrointestinal hemorrhage.   2. 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   3. Please see report for several additional findings.     The patient was admitted to the Hospital Medicine Service for further evaluation and management.     Past Medical History:   Diagnosis Date    Migraine headache        History reviewed. No pertinent surgical history.    Review of patient's allergies indicates:   Allergen Reactions    Asa [aspirin]        No current facility-administered medications on file prior to encounter.     Current Outpatient Medications on File Prior to Encounter   Medication Sig    ibuprofen (ADVIL,MOTRIN) 600 MG tablet Take 1 tablet (600 mg total) by mouth every 6 (six) hours as needed for Pain.    sumatriptan (IMITREX) 100 MG tablet Take by mouth.    topiramate (TOPAMAX) 50 MG tablet Take 50 mg by mouth 2 (two) times daily.    traMADoL (ULTRAM) 50 mg tablet Take 50 mg by mouth.    cyclobenzaprine (FLEXERIL) 10 MG tablet Take 10 mg by mouth nightly as needed.    LINZESS 145 mcg Cap capsule Take 145 mcg by mouth.    ondansetron (ZOFRAN-ODT) 4 MG TbDL Take 1 tablet (4 mg total) by mouth every 8 (eight) hours as needed (for nausea).    pantoprazole (PROTONIX) 40 MG tablet Take 40 mg by mouth every morning.    [DISCONTINUED] albuterol 90 mcg/actuation inhaler Inhale 2 puffs into the lungs every 6 (six) hours as needed for Wheezing.    [DISCONTINUED] etodolac (LODINE) 400 MG tablet Take 1 tablet (400 mg total) by mouth 2 (two) times daily as needed (Pain). Take with food    [DISCONTINUED] UNKNOWN TO PATIENT      Family History    None       Tobacco Use    Smoking status: Never    Smokeless tobacco: Never    Substance and Sexual Activity    Alcohol use: No    Drug use: No    Sexual activity: Not Currently     Review of Systems   Constitutional:  Positive for activity change and appetite change. Negative for chills, diaphoresis, fatigue and fever.   Respiratory:  Positive for chest tightness and shortness of breath. Negative for cough and wheezing.    Cardiovascular:  Positive for chest pain and palpitations. Negative for leg swelling.   Gastrointestinal:  Positive for blood in stool and nausea. Negative for abdominal pain, constipation, diarrhea and vomiting.   Genitourinary:  Negative for decreased urine volume, difficulty urinating, dysuria, hematuria and urgency.   Neurological:  Positive for dizziness, syncope (pre-syncope), weakness, light-headedness and headaches.     Objective:     Vital Signs (Most Recent):  Temp: 98 °F (36.7 °C) (07/29/25 1618)  Pulse: 86 (07/29/25 1618)  Resp: 14 (07/29/25 1618)  BP: (!) 125/58 (07/29/25 1618)  SpO2: 100 % (07/29/25 1603) Vital Signs (24h Range):  Temp:  [98 °F (36.7 °C)-99.1 °F (37.3 °C)] 98 °F (36.7 °C)  Pulse:  [] 86  Resp:  [14-20] 14  SpO2:  [98 %-100 %] 100 %  BP: (102-130)/(51-84) 125/58     Weight: 83.9 kg (185 lb)  Body mass index is 30.79 kg/m².     Physical Exam  Vitals and nursing note reviewed.   Constitutional:       General: She is not in acute distress.     Appearance: Normal appearance. She is well-developed and normal weight. She is ill-appearing. She is not toxic-appearing.   HENT:      Head: Normocephalic and atraumatic.      Mouth/Throat:      Dentition: Normal dentition.   Eyes:      General: Lids are normal.      Extraocular Movements: Extraocular movements intact.      Conjunctiva/sclera: Conjunctivae normal.   Cardiovascular:      Rate and Rhythm: Normal rate and regular rhythm.      Heart sounds: Normal heart sounds. No murmur heard.  Pulmonary:      Effort: Pulmonary effort is normal.      Breath sounds: Normal breath sounds.   Abdominal:       General: There is no distension.      Palpations: Abdomen is soft.      Tenderness: There is no abdominal tenderness.   Musculoskeletal:      Cervical back: Neck supple.      Right lower leg: No edema.      Left lower leg: No edema.   Skin:     General: Skin is warm and dry.      Findings: No erythema or rash.   Neurological:      Mental Status: She is alert and oriented to person, place, and time.             Significant Labs: All pertinent labs within the past 24 hours have been reviewed.  CBC:   Recent Labs   Lab 07/29/25  1211 07/29/25  1307 07/29/25  1558   WBC 7.64  --  10.43   HGB 7.5*  --  6.2*   HCT 22.4* 19* 18.5*     --  203     CMP:   Recent Labs   Lab 07/29/25  1211      K 3.9      CO2 21*      BUN 22*   CREATININE 0.6   CALCIUM 8.6*   PROT 6.7   ALBUMIN 4.1   BILITOT 0.2   ALKPHOS 61   AST 15   ALT 10   ANIONGAP 7*     TSH:   Recent Labs   Lab 07/29/25  1211   TSH 0.828     Urine Studies:   Recent Labs   Lab 07/29/25  1239   COLORU Yellow   APPEARANCEUA Clear   PHUR 7.0   SPECGRAV 1.025   PROTEINUA Negative   GLUCUA Negative   BILIRUBINUA Negative   OCCULTUA Negative   NITRITE Negative   UROBILINOGEN Negative   LEUKOCYTESUR Negative       Significant Imaging: I have reviewed all pertinent imaging results/findings within the past 24 hours.  Assessment/Plan:     Assessment & Plan  Acute upper GI bleed  Episode of syncope  GERD (gastroesophageal reflux disease)  Chronic constipation  Symptomatic anemia  -admitted due to acute suspected U-GIB with symptomatic anemia likely related to aspirin containing powder use with history of GERD and chronic constipation  -at admission HDS and afebrile  -ED workup detailed above  -GIB pathway initiated  -GI consulted  -pending 2 units PRBC transfusion >>> additional units pending H/H trending  -serial H/H monitoring  -begin IV PPI BID per pathway  -continue IVF hydration   -NPO status   -orthostatics qshift  -strict I&Os  -PRN supportive care  as indicated   -hold home linzess  -monitor for development of constipation   -trend labs, address/replete electrolytes and transfuse as indicated  -fall precautions       VTE Risk Mitigation (From admission, onward)           Ordered     Reason for No Pharmacological VTE Prophylaxis  Once        Question:  Reasons:  Answer:  Risk of Bleeding    07/29/25 1551     Place sequential compression device  Until discontinued         07/29/25 1551                      Gilma Sanchez DNP, AG-ACNP, BC  Department of Hospital Medicine  [facility]

## 2025-07-29 NOTE — SUBJECTIVE & OBJECTIVE
Past Medical History:   Diagnosis Date    Migraine headache        History reviewed. No pertinent surgical history.    Review of patient's allergies indicates:   Allergen Reactions    Asa [aspirin]        No current facility-administered medications on file prior to encounter.     Current Outpatient Medications on File Prior to Encounter   Medication Sig    ibuprofen (ADVIL,MOTRIN) 600 MG tablet Take 1 tablet (600 mg total) by mouth every 6 (six) hours as needed for Pain.    sumatriptan (IMITREX) 100 MG tablet Take by mouth.    topiramate (TOPAMAX) 50 MG tablet Take 50 mg by mouth 2 (two) times daily.    traMADoL (ULTRAM) 50 mg tablet Take 50 mg by mouth.    cyclobenzaprine (FLEXERIL) 10 MG tablet Take 10 mg by mouth nightly as needed.    LINZESS 145 mcg Cap capsule Take 145 mcg by mouth.    ondansetron (ZOFRAN-ODT) 4 MG TbDL Take 1 tablet (4 mg total) by mouth every 8 (eight) hours as needed (for nausea).    pantoprazole (PROTONIX) 40 MG tablet Take 40 mg by mouth every morning.    [DISCONTINUED] albuterol 90 mcg/actuation inhaler Inhale 2 puffs into the lungs every 6 (six) hours as needed for Wheezing.    [DISCONTINUED] etodolac (LODINE) 400 MG tablet Take 1 tablet (400 mg total) by mouth 2 (two) times daily as needed (Pain). Take with food    [DISCONTINUED] UNKNOWN TO PATIENT      Family History    None       Tobacco Use    Smoking status: Never    Smokeless tobacco: Never   Substance and Sexual Activity    Alcohol use: No    Drug use: No    Sexual activity: Not Currently     Review of Systems   Constitutional:  Positive for activity change and appetite change. Negative for chills, diaphoresis, fatigue and fever.   Respiratory:  Positive for chest tightness and shortness of breath. Negative for cough and wheezing.    Cardiovascular:  Positive for chest pain and palpitations. Negative for leg swelling.   Gastrointestinal:  Positive for blood in stool and nausea. Negative for abdominal pain, constipation, diarrhea  and vomiting.   Genitourinary:  Negative for decreased urine volume, difficulty urinating, dysuria, hematuria and urgency.   Neurological:  Positive for dizziness, syncope (pre-syncope), weakness, light-headedness and headaches.     Objective:     Vital Signs (Most Recent):  Temp: 98 °F (36.7 °C) (07/29/25 1618)  Pulse: 86 (07/29/25 1618)  Resp: 14 (07/29/25 1618)  BP: (!) 125/58 (07/29/25 1618)  SpO2: 100 % (07/29/25 1603) Vital Signs (24h Range):  Temp:  [98 °F (36.7 °C)-99.1 °F (37.3 °C)] 98 °F (36.7 °C)  Pulse:  [] 86  Resp:  [14-20] 14  SpO2:  [98 %-100 %] 100 %  BP: (102-130)/(51-84) 125/58     Weight: 83.9 kg (185 lb)  Body mass index is 30.79 kg/m².     Physical Exam  Vitals and nursing note reviewed.   Constitutional:       General: She is not in acute distress.     Appearance: Normal appearance. She is well-developed and normal weight. She is ill-appearing. She is not toxic-appearing.   HENT:      Head: Normocephalic and atraumatic.      Mouth/Throat:      Dentition: Normal dentition.   Eyes:      General: Lids are normal.      Extraocular Movements: Extraocular movements intact.      Conjunctiva/sclera: Conjunctivae normal.   Cardiovascular:      Rate and Rhythm: Normal rate and regular rhythm.      Heart sounds: Normal heart sounds. No murmur heard.  Pulmonary:      Effort: Pulmonary effort is normal.      Breath sounds: Normal breath sounds.   Abdominal:      General: There is no distension.      Palpations: Abdomen is soft.      Tenderness: There is no abdominal tenderness.   Musculoskeletal:      Cervical back: Neck supple.      Right lower leg: No edema.      Left lower leg: No edema.   Skin:     General: Skin is warm and dry.      Findings: No erythema or rash.   Neurological:      Mental Status: She is alert and oriented to person, place, and time.             Significant Labs: All pertinent labs within the past 24 hours have been reviewed.  CBC:   Recent Labs   Lab 07/29/25  1211  07/29/25  1307 07/29/25  1558   WBC 7.64  --  10.43   HGB 7.5*  --  6.2*   HCT 22.4* 19* 18.5*     --  203     CMP:   Recent Labs   Lab 07/29/25  1211      K 3.9      CO2 21*      BUN 22*   CREATININE 0.6   CALCIUM 8.6*   PROT 6.7   ALBUMIN 4.1   BILITOT 0.2   ALKPHOS 61   AST 15   ALT 10   ANIONGAP 7*     TSH:   Recent Labs   Lab 07/29/25  1211   TSH 0.828     Urine Studies:   Recent Labs   Lab 07/29/25  1239   COLORU Yellow   APPEARANCEUA Clear   PHUR 7.0   SPECGRAV 1.025   PROTEINUA Negative   GLUCUA Negative   BILIRUBINUA Negative   OCCULTUA Negative   NITRITE Negative   UROBILINOGEN Negative   LEUKOCYTESUR Negative       Significant Imaging: I have reviewed all pertinent imaging results/findings within the past 24 hours.

## 2025-07-29 NOTE — FIRST PROVIDER EVALUATION
"Medical screening examination initiated.  I have conducted a focused provider triage encounter, findings are as follows:    Brief history of present illness:  feeling weak and "off" for the past 2 days    There were no vitals filed for this visit.    Pertinent physical exam:  no respiratory distress    Brief workup plan:  urinalysis, drug screen, labs    Preliminary workup initiated; this workup will be continued and followed by the physician or advanced practice provider that is assigned to the patient when roomed.  "

## 2025-07-29 NOTE — HPI
Ms. Regan is a 40 YOF with PMHx of migraines, recurrent UTIs, GERD, and chronic intermittent constipation. She presents to ED due to complaints of SOB/ALONSO, chest pain, palpitations, weakness/fatigue, light headiness, dizziness, nausea, headaches, decreased appetite, and black stools for the last two days. She denies history of GIB or bloody/dark stools. She admits to frequent use of Goody's Powder (every other day or daily at times) and intermittent but more use of ibuprofen. She also notes only taking home pantoprazole as needed but not routinely.     She denies fever, chills, recent illness, changes in menstrual cycles or increased menstrual bleeding, abdominal pain, vomiting, diarrhea, constipation, urinary symptoms or hematuria, or decreased UOP. She lives with chris, is independent in ADLs, and denies ambulatory aide use. She denies tobacco, marijuana, alcohol, or illicit drug use.     In the ED she is HDS and afebrile, however did have near syncopal episode after ambulating to the bathroom. Her fiance was nearby at the time and notes that she seemed to almost fall back after standing from toilet however did not have LOC or fall/injury. CBC with WBC 7.6, H/H 7.5/22.4 with repeat 6.2/18.5, platelets 238. Chemistry with , K 3.9, calcium 8.6, chloride 109, CO2 21, BUN 22, SCr 0.6, glucose 100. LFTs unremarkable. TSH 0.828. Hep C and HIV non reactive. Urine hCG negative. Flu and covid negative. UA noninfectious. CXR without acute cardiopulmonary proces    CTA GIB protocol:  1. No contrast extravasation within the large bowel or elsewhere to indicate site of active gastrointestinal hemorrhage.   2. 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   3. Please see report for several additional findings.     The patient was admitted to the Hospital Medicine Service for further evaluation and management.

## 2025-07-30 ENCOUNTER — ANESTHESIA EVENT (OUTPATIENT)
Dept: ENDOSCOPY | Facility: OTHER | Age: 40
DRG: 379 | End: 2025-07-30
Payer: MEDICAID

## 2025-07-30 ENCOUNTER — ANESTHESIA (OUTPATIENT)
Dept: ENDOSCOPY | Facility: OTHER | Age: 40
DRG: 379 | End: 2025-07-30
Payer: MEDICAID

## 2025-07-30 LAB
ABSOLUTE EOSINOPHIL (OHS): 0.03 K/UL
ABSOLUTE MONOCYTE (OHS): 0.74 K/UL (ref 0.3–1)
ABSOLUTE NEUTROPHIL COUNT (OHS): 3.9 K/UL (ref 1.8–7.7)
ALBUMIN SERPL BCP-MCNC: 3.1 G/DL (ref 3.5–5.2)
ALP SERPL-CCNC: 49 UNIT/L (ref 40–150)
ALT SERPL W/O P-5'-P-CCNC: 8 UNIT/L (ref 10–44)
ANION GAP (OHS): 6 MMOL/L (ref 8–16)
AST SERPL-CCNC: 12 UNIT/L (ref 11–45)
BASOPHILS # BLD AUTO: 0.02 K/UL
BASOPHILS NFR BLD AUTO: 0.3 %
BILIRUB SERPL-MCNC: 0.5 MG/DL (ref 0.1–1)
BUN SERPL-MCNC: 12 MG/DL (ref 6–20)
CALCIUM SERPL-MCNC: 7.8 MG/DL (ref 8.7–10.5)
CHLORIDE SERPL-SCNC: 112 MMOL/L (ref 95–110)
CO2 SERPL-SCNC: 20 MMOL/L (ref 23–29)
CREAT SERPL-MCNC: 0.7 MG/DL (ref 0.5–1.4)
ERYTHROCYTE [DISTWIDTH] IN BLOOD BY AUTOMATED COUNT: 15.1 % (ref 11.5–14.5)
GFR SERPLBLD CREATININE-BSD FMLA CKD-EPI: >60 ML/MIN/1.73/M2
GLUCOSE SERPL-MCNC: 87 MG/DL (ref 70–110)
HCT VFR BLD AUTO: 23.6 % (ref 37–48.5)
HCT VFR BLD AUTO: 24.9 % (ref 37–48.5)
HGB BLD-MCNC: 8 GM/DL (ref 12–16)
HGB BLD-MCNC: 8.4 GM/DL (ref 12–16)
HOLD SPECIMEN: NORMAL
IMM GRANULOCYTES # BLD AUTO: 0.03 K/UL (ref 0–0.04)
IMM GRANULOCYTES NFR BLD AUTO: 0.4 % (ref 0–0.5)
LYMPHOCYTES # BLD AUTO: 2.85 K/UL (ref 1–4.8)
MAGNESIUM SERPL-MCNC: 1.9 MG/DL (ref 1.6–2.6)
MCH RBC QN AUTO: 30.2 PG (ref 27–31)
MCHC RBC AUTO-ENTMCNC: 33.9 G/DL (ref 32–36)
MCV RBC AUTO: 89 FL (ref 82–98)
NUCLEATED RBC (/100WBC) (OHS): 1 /100 WBC
PLATELET # BLD AUTO: 158 K/UL (ref 150–450)
PMV BLD AUTO: 9.4 FL (ref 9.2–12.9)
POTASSIUM SERPL-SCNC: 3.7 MMOL/L (ref 3.5–5.1)
PROT SERPL-MCNC: 5.1 GM/DL (ref 6–8.4)
RBC # BLD AUTO: 2.65 M/UL (ref 4–5.4)
RELATIVE EOSINOPHIL (OHS): 0.4 %
RELATIVE LYMPHOCYTE (OHS): 37.6 % (ref 18–48)
RELATIVE MONOCYTE (OHS): 9.8 % (ref 4–15)
RELATIVE NEUTROPHIL (OHS): 51.5 % (ref 38–73)
SODIUM SERPL-SCNC: 138 MMOL/L (ref 136–145)
WBC # BLD AUTO: 7.57 K/UL (ref 3.9–12.7)

## 2025-07-30 PROCEDURE — 63600175 PHARM REV CODE 636 W HCPCS: Performed by: NURSE ANESTHETIST, CERTIFIED REGISTERED

## 2025-07-30 PROCEDURE — 88305 TISSUE EXAM BY PATHOLOGIST: CPT | Mod: 26,,, | Performed by: PATHOLOGY

## 2025-07-30 PROCEDURE — 25000003 PHARM REV CODE 250: Performed by: PHYSICIAN ASSISTANT

## 2025-07-30 PROCEDURE — 25000003 PHARM REV CODE 250: Performed by: NURSE ANESTHETIST, CERTIFIED REGISTERED

## 2025-07-30 PROCEDURE — 37000009 HC ANESTHESIA EA ADD 15 MINS: Performed by: INTERNAL MEDICINE

## 2025-07-30 PROCEDURE — 63600175 PHARM REV CODE 636 W HCPCS: Performed by: STUDENT IN AN ORGANIZED HEALTH CARE EDUCATION/TRAINING PROGRAM

## 2025-07-30 PROCEDURE — 25000003 PHARM REV CODE 250: Performed by: INTERNAL MEDICINE

## 2025-07-30 PROCEDURE — 25000003 PHARM REV CODE 250: Performed by: NURSE PRACTITIONER

## 2025-07-30 PROCEDURE — 63600175 PHARM REV CODE 636 W HCPCS: Performed by: NURSE PRACTITIONER

## 2025-07-30 PROCEDURE — 43239 EGD BIOPSY SINGLE/MULTIPLE: CPT | Performed by: INTERNAL MEDICINE

## 2025-07-30 PROCEDURE — 36415 COLL VENOUS BLD VENIPUNCTURE: CPT | Performed by: NURSE PRACTITIONER

## 2025-07-30 PROCEDURE — 94761 N-INVAS EAR/PLS OXIMETRY MLT: CPT

## 2025-07-30 PROCEDURE — 85014 HEMATOCRIT: CPT | Performed by: NURSE PRACTITIONER

## 2025-07-30 PROCEDURE — 85018 HEMOGLOBIN: CPT | Performed by: NURSE PRACTITIONER

## 2025-07-30 PROCEDURE — 83735 ASSAY OF MAGNESIUM: CPT | Performed by: NURSE PRACTITIONER

## 2025-07-30 PROCEDURE — 85025 COMPLETE CBC W/AUTO DIFF WBC: CPT | Performed by: NURSE PRACTITIONER

## 2025-07-30 PROCEDURE — 94799 UNLISTED PULMONARY SVC/PX: CPT

## 2025-07-30 PROCEDURE — 37000008 HC ANESTHESIA 1ST 15 MINUTES: Performed by: INTERNAL MEDICINE

## 2025-07-30 PROCEDURE — 21400001 HC TELEMETRY ROOM

## 2025-07-30 PROCEDURE — 82040 ASSAY OF SERUM ALBUMIN: CPT | Performed by: NURSE PRACTITIONER

## 2025-07-30 PROCEDURE — 0DB78ZX EXCISION OF STOMACH, PYLORUS, VIA NATURAL OR ARTIFICIAL OPENING ENDOSCOPIC, DIAGNOSTIC: ICD-10-PCS | Performed by: INTERNAL MEDICINE

## 2025-07-30 PROCEDURE — 25000003 PHARM REV CODE 250: Performed by: STUDENT IN AN ORGANIZED HEALTH CARE EDUCATION/TRAINING PROGRAM

## 2025-07-30 PROCEDURE — 88305 TISSUE EXAM BY PATHOLOGIST: CPT | Mod: TC | Performed by: INTERNAL MEDICINE

## 2025-07-30 PROCEDURE — 99900035 HC TECH TIME PER 15 MIN (STAT)

## 2025-07-30 RX ORDER — PHENYLEPHRINE HYDROCHLORIDE 10 MG/ML
INJECTION INTRAVENOUS
Status: DISCONTINUED | OUTPATIENT
Start: 2025-07-30 | End: 2025-07-30

## 2025-07-30 RX ORDER — SODIUM CHLORIDE, SODIUM LACTATE, POTASSIUM CHLORIDE, CALCIUM CHLORIDE 600; 310; 30; 20 MG/100ML; MG/100ML; MG/100ML; MG/100ML
INJECTION, SOLUTION INTRAVENOUS CONTINUOUS
Status: DISCONTINUED | OUTPATIENT
Start: 2025-07-30 | End: 2025-07-31

## 2025-07-30 RX ORDER — POLYETHYLENE GLYCOL 3350, SODIUM SULFATE ANHYDROUS, SODIUM BICARBONATE, SODIUM CHLORIDE, POTASSIUM CHLORIDE 236; 22.74; 6.74; 5.86; 2.97 G/4L; G/4L; G/4L; G/4L; G/4L
4000 POWDER, FOR SOLUTION ORAL ONCE
Status: COMPLETED | OUTPATIENT
Start: 2025-07-30 | End: 2025-07-30

## 2025-07-30 RX ORDER — HYDROXYZINE HYDROCHLORIDE 25 MG/1
25 TABLET, FILM COATED ORAL ONCE AS NEEDED
Status: COMPLETED | OUTPATIENT
Start: 2025-07-30 | End: 2025-07-30

## 2025-07-30 RX ORDER — LIDOCAINE HYDROCHLORIDE 10 MG/ML
INJECTION, SOLUTION INTRAVENOUS
Status: DISCONTINUED | OUTPATIENT
Start: 2025-07-30 | End: 2025-07-30

## 2025-07-30 RX ORDER — DEXMEDETOMIDINE HYDROCHLORIDE 100 UG/ML
INJECTION, SOLUTION INTRAVENOUS
Status: DISCONTINUED | OUTPATIENT
Start: 2025-07-30 | End: 2025-07-30

## 2025-07-30 RX ORDER — PROPOFOL 10 MG/ML
VIAL (ML) INTRAVENOUS
Status: DISCONTINUED | OUTPATIENT
Start: 2025-07-30 | End: 2025-07-30

## 2025-07-30 RX ORDER — ACETAMINOPHEN 500 MG
1000 TABLET ORAL ONCE
Status: COMPLETED | OUTPATIENT
Start: 2025-07-30 | End: 2025-07-30

## 2025-07-30 RX ORDER — MAGNESIUM SULFATE HEPTAHYDRATE 40 MG/ML
2 INJECTION, SOLUTION INTRAVENOUS ONCE
Status: COMPLETED | OUTPATIENT
Start: 2025-07-30 | End: 2025-07-30

## 2025-07-30 RX ORDER — PROPOFOL 10 MG/ML
VIAL (ML) INTRAVENOUS CONTINUOUS PRN
Status: DISCONTINUED | OUTPATIENT
Start: 2025-07-30 | End: 2025-07-30

## 2025-07-30 RX ADMIN — PROPOFOL 50 MG: 10 INJECTION, EMULSION INTRAVENOUS at 08:07

## 2025-07-30 RX ADMIN — HYDROXYZINE HYDROCHLORIDE 25 MG: 25 TABLET, FILM COATED ORAL at 09:07

## 2025-07-30 RX ADMIN — PHENYLEPHRINE HYDROCHLORIDE 100 MCG: 10 INJECTION INTRAVENOUS at 08:07

## 2025-07-30 RX ADMIN — SODIUM CHLORIDE, POTASSIUM CHLORIDE, SODIUM LACTATE AND CALCIUM CHLORIDE: 600; 310; 30; 20 INJECTION, SOLUTION INTRAVENOUS at 08:07

## 2025-07-30 RX ADMIN — LIDOCAINE HYDROCHLORIDE 50 MG: 10 INJECTION, SOLUTION INTRAVENOUS at 08:07

## 2025-07-30 RX ADMIN — MUPIROCIN: 20 OINTMENT TOPICAL at 09:07

## 2025-07-30 RX ADMIN — MAGNESIUM SULFATE HEPTAHYDRATE 2 G: 40 INJECTION, SOLUTION INTRAVENOUS at 02:07

## 2025-07-30 RX ADMIN — POLYETHYLENE GLYCOL 3350, SODIUM SULFATE ANHYDROUS, SODIUM BICARBONATE, SODIUM CHLORIDE, POTASSIUM CHLORIDE 4000 ML: 236; 22.74; 6.74; 5.86; 2.97 POWDER, FOR SOLUTION ORAL at 06:07

## 2025-07-30 RX ADMIN — PANTOPRAZOLE SODIUM 40 MG: 40 INJECTION, POWDER, FOR SOLUTION INTRAVENOUS at 08:07

## 2025-07-30 RX ADMIN — PROPOFOL 125 MCG/KG/MIN: 10 INJECTION, EMULSION INTRAVENOUS at 08:07

## 2025-07-30 RX ADMIN — ACETAMINOPHEN 1000 MG: 500 TABLET ORAL at 02:07

## 2025-07-30 RX ADMIN — ACETAMINOPHEN 650 MG: 325 TABLET, FILM COATED ORAL at 12:07

## 2025-07-30 RX ADMIN — GLYCOPYRROLATE 0.2 MG: 0.2 INJECTION, SOLUTION INTRAMUSCULAR; INTRAVITREAL at 07:07

## 2025-07-30 RX ADMIN — PANTOPRAZOLE SODIUM 40 MG: 40 INJECTION, POWDER, FOR SOLUTION INTRAVENOUS at 09:07

## 2025-07-30 RX ADMIN — DEXMEDETOMIDINE HYDROCHLORIDE 8 MCG: 100 INJECTION, SOLUTION, CONCENTRATE INTRAVENOUS at 07:07

## 2025-07-30 RX ADMIN — SODIUM CHLORIDE, POTASSIUM CHLORIDE, SODIUM LACTATE AND CALCIUM CHLORIDE: 600; 310; 30; 20 INJECTION, SOLUTION INTRAVENOUS at 06:07

## 2025-07-30 RX ADMIN — SODIUM CHLORIDE: 0.9 INJECTION, SOLUTION INTRAVENOUS at 07:07

## 2025-07-30 NOTE — [employer] PATIENT INSTRUCTIONS
Discharge Summary/Instructions after an Endoscopic Procedure  Patient Name: Carlyn Regan  Patient MRN: [id number]  Patient YOB: 1985 Wednesday, July 30, 2025  Terry Gabriel MD  RESTRICTIONS:  During your procedure today, you received medications for sedation.  These   medications may affect your judgment, balance and coordination.  Therefore,   for 24 hours, you have the following restrictions:   - DO NOT drive a car, operate machinery, make legal/financial decisions,   sign important papers or drink alcohol.    ACTIVITY:  Today: no heavy lifting, straining or running due to procedural   sedation/anesthesia.  The following day: return to full activity including work.  DIET:  Eat and drink normally unless instructed otherwise.     TREATMENT FOR COMMON SIDE EFFECTS:  - Mild abdominal pain, nausea, belching, bloating or excessive gas:  rest,   eat lightly and use a heating pad.  - Sore Throat: treat with throat lozenges and/or gargle with warm salt   water.  - Because air was used during the procedure, expelling large amounts of air   from your rectum or belching is normal.  - If a bowel prep was taken, you may not have a bowel movement for 1-3 days.    This is normal.  SYMPTOMS TO WATCH FOR AND REPORT TO YOUR PHYSICIAN:  1. Abdominal pain or bloating, other than gas cramps.  2. Chest pain.  3. Back pain.  4. Signs of infection such as: chills or fever occurring within 24 hours   after the procedure.  5. Rectal bleeding, which would show as bright red, maroon, or black stools.   (A tablespoon of blood from the rectum is not serious, especially if   hemorrhoids are present.)  6. Vomiting.  7. Weakness or dizziness.  GO DIRECTLY TO THE NEAREST EMERGENCY ROOM IF YOU HAVE ANY OF THE FOLLOWING:      Difficulty breathing              Chills and/or fever over 101 F   Persistent vomiting and/or vomiting blood   Severe abdominal pain   Severe chest pain   Black, tarry stools   Bleeding- more than one  tablespoon   Any other symptom or condition that you feel may need urgent attention  Your doctor recommends these additional instructions:  If any biopsies were taken, your doctors clinic will contact you in 1 to 2   weeks with any results.  - Return patient to [facility] for [facility] care.   - Perform a colonoscopy tomorrow.  For questions, problems or results please call your physician - Terry Gabriel MD at Work:  [phone].  [facility], EMERGENCY ROOM PHONE NUMBER: [phone], [facility]   [phone].  IF A COMPLICATION OR EMERGENCY SITUATION ARISES AND YOU ARE UNABLE TO REACH   YOUR PHYSICIAN - GO DIRECTLY TO THE EMERGENCY ROOM.  Terry Gabriel MD  7/30/2025 8:20:57 AM  This report has been verified and signed electronically.  Dear patient,  As a result of recent federal legislation (The Federal Cures Act), you may   receive lab or pathology results from your procedure in your [facility]   account before your physician is able to contact you. Your physician or   their representative will relay the results to you with their   recommendations at their soonest availability.  Thank you,  [employer]

## 2025-07-30 NOTE — ANESTHESIA POSTPROCEDURE EVALUATION
Anesthesia Post Evaluation    Patient: Carlyn Regan    Procedure(s) Performed: Procedure(s) (LRB):  EGD (ESOPHAGOGASTRODUODENOSCOPY) (N/A)    Final Anesthesia Type: general      Patient location during evaluation: PACU  Patient participation: Yes- Able to Participate  Level of consciousness: awake and alert  Post-procedure vital signs: reviewed and stable  Pain management: adequate  Airway patency: patent    PONV status at discharge: No PONV  Anesthetic complications: no      Cardiovascular status: blood pressure returned to baseline  Respiratory status: unassisted  Hydration status: euvolemic  Follow-up not needed.              Vitals Value Taken Time   /55 07/30/25 08:48   Temp 36.2 °C (97.1 °F) 07/30/25 08:19   Pulse 91 07/30/25 08:53   Resp 18 07/30/25 08:19   SpO2 100 % 07/30/25 08:53   Vitals shown include unfiled device data.      Event Time   Out of Recovery 07/30/2025 08:57:59         Pain/Brian Score: Pain Rating Prior to Med Admin: 10 (7/29/2025  8:48 PM)  Pain Rating Post Med Admin: 6 (7/29/2025  6:12 PM)  Brian Score: 10 (7/30/2025  8:46 AM)

## 2025-07-30 NOTE — HOSPITAL COURSE
Transfused 2 units pRBC with improvement in Hb to 8.4. EGD completed  - 'The esophagus was normal. Patchy mildly erythematous mucosa was found in the gastric antrum. Biopsies were taken with a cold forceps for Helicobacter pylori testing.' Colonoscopy - 'The entire examined colon is normal on direct and retroflexion views.' As such, no clear bleeding aetiology seen. Hemoglobin stable. Dicussed with GI and pt stable for discharge but will need close follow up with GI (referral placed) and repeat labs to monitor hemoglobin next week. Pt advised to stop use of goody powder/NSAID's. Refill home protonix.

## 2025-07-30 NOTE — ASSESSMENT & PLAN NOTE
-admitted due to acute suspected U-GIB with symptomatic anemia likely related to aspirin containing powder use with history of GERD and chronic constipation  -at admission HDS and afebrile  -ED workup detailed above  -GIB pathway initiated  -GI consulted  -pending 2 units PRBC transfusion >>> additional units pending H/H trending  -serial H/H monitoring  -begin IV PPI BID per pathway  -continue IVF hydration   -orthostatics qshift  -strict I&Os  -PRN supportive care as indicated   -hold home linzess  -monitor for development of constipation   -trend labs, address/replete electrolytes and transfuse as indicated  -fall precautions     7/30 - EGD completed  - 'The esophagus was normal. Patchy mildly erythematous mucosa was found in the gastric antrum. Biopsies were taken with a cold forceps for Helicobacter pylori testing. No bleeding aetiology seen'. Plan for colonoscopy tomorrow.

## 2025-07-30 NOTE — PLAN OF CARE
Case Management Assessment     PCP: Florian oRwland MD  Pharmacy: bedside rx    Patient Arrived From: home  Existing Help at Home: lives alone    Barriers to Discharge: none    Discharge Plan:    A. Home    B. home      Assessment completed with patient at bedside, patient alert and oriented. Patient lives alone, independent in ADLs, denies HH/HD/DME. Patient family to transport patient home at discharge.        [facility] ([address])  Initial Discharge Assessment       Primary Care Provider: Florian Rowland MD    Admission Diagnosis: Acute blood loss anemia [D62]  Dyspnea [R06.00]  Acute GI bleeding [K92.2]  Gastrointestinal hemorrhage, unspecified gastrointestinal hemorrhage type [K92.2]    Admission Date: 7/29/2025  Expected Discharge Date:     Transition of Care Barriers: None    Payor: MEDICAID / Plan: [facility] COMMUNITY PLAN [facility] ([place] MEDICAID) / Product Type: Managed Medicaid /     Extended Emergency Contact Information  Primary Emergency Contact: Santiago Regan   [facility]  Home Phone: [phone]  Relation: Brother  Secondary Emergency Contact: Maye Mcdaniel  Mobile Phone: [phone]  Relation: Sister    Discharge Plan A: Home  Discharge Plan B: Home      [id number] - [place], [place] - [address] AT [facility] [address]  [address]  [place] [address]  Phone: [phone] Fax: [phone]    [id number] [place] - [address]  [facility] [address]  [place] [address]  Phone: [phone] Fax: [phone]      Initial Assessment (most recent)       Adult Discharge Assessment - 07/30/25 1308          Discharge Assessment    Assessment Type Discharge Planning Assessment     Confirmed/corrected address, phone number and insurance Yes     Confirmed Demographics Correct on Facesheet     Source of Information patient     Communicated CINDY with  patient/caregiver Date not available/Unable to determine     People in Home alone     Do you expect to return to your current living situation? Yes     Do you have help at home or someone to help you manage your care at home? No     Prior to hospitilization cognitive status: Alert/Oriented     Current cognitive status: Alert/Oriented     Walking or Climbing Stairs Difficulty no     Dressing/Bathing Difficulty no     Equipment Currently Used at Home none     Patient currently being followed by outpatient case management? No     Do you currently have service(s) that help you manage your care at home? No     Do you take prescription medications? No     Who is going to help you get home at discharge? family     How do you get to doctors appointments? car, drives self     Are you on dialysis? No     Do you take coumadin? No     Discharge Plan A Home     Discharge Plan B Home     DME Needed Upon Discharge  none     Discharge Plan discussed with: Patient     Transition of Care Barriers None        Physical Activity    On average, how many days per week do you engage in moderate to strenuous exercise (like a brisk walk)? Patient declined     On average, how many minutes do you engage in exercise at this level? Patient declined        Financial Resource Strain    How hard is it for you to pay for the very basics like food, housing, medical care, and heating? Patient declined        Housing Stability    In the last 12 months, was there a time when you were not able to pay the mortgage or rent on time? Patient declined     At any time in the past 12 months, were you homeless or living in a shelter (including now)? Patient declined        Transportation Needs    In the past 12 months, has lack of transportation kept you from medical appointments or from getting medications? Patient declined     In the past 12 months, has lack of transportation kept you from meetings, work, or from getting things needed for daily living? Patient  declined        Food Insecurity    Within the past 12 months, you worried that your food would run out before you got the money to buy more. Patient declined     Within the past 12 months, the food you bought just didn't last and you didn't have money to get more. Patient declined        Stress    Do you feel stress - tense, restless, nervous, or anxious, or unable to sleep at night because your mind is troubled all the time - these days? Patient declined        Social Isolation    How often do you feel lonely or isolated from those around you?  Patient declined        Alcohol Use    Q1: How often do you have a drink containing alcohol? Patient declined     Q2: How many drinks containing alcohol do you have on a typical day when you are drinking? Patient declined     Q3: How often do you have six or more drinks on one occasion? Patient declined        Utilities    In the past 12 months has the electric, gas, oil, or water company threatened to shut off services in your home? Patient declined        Health Literacy    How often do you need to have someone help you when you read instructions, pamphlets, or other written material from your doctor or pharmacy? Patient declines to respond

## 2025-07-30 NOTE — TRANSFER OF CARE
"Anesthesia Transfer of Care Note    Patient: Carlyn Regan    Procedure(s) Performed: Procedure(s) (LRB):  EGD (ESOPHAGOGASTRODUODENOSCOPY) (N/A)    Patient location: PACU    Anesthesia Type: general and MAC    Transport from OR: Transported from OR on room air with adequate spontaneous ventilation    Post pain: adequate analgesia    Post assessment: no apparent anesthetic complications    Post vital signs: stable    Level of consciousness: awake and sedated    Nausea/Vomiting: no nausea/vomiting    Complications: none    Transfer of care protocol was followed      Last vitals: Visit Vitals  /68 (Patient Position: Lying)   Pulse 91   Temp 37 °C (98.6 °F) (Oral)   Resp 18   Ht 5' 5" (1.651 m)   Wt 83.9 kg (185 lb)   SpO2 97%   Breastfeeding No   BMI 30.79 kg/m²     "

## 2025-07-30 NOTE — ANESTHESIA PREPROCEDURE EVALUATION
07/30/2025  Carlyn Regan is a 40 y.o., female.      Pre-op Assessment    I have reviewed the Patient Summary Reports.     I have reviewed the Nursing Notes. I have reviewed the NPO Status.   I have reviewed the Medications.     Review of Systems  Anesthesia Hx:  No problems with previous Anesthesia             Denies Family Hx of Anesthesia complications.    Denies Personal Hx of Anesthesia complications.                    Social:  Non-Smoker       Hematology/Oncology:    Oncology Normal    -- Anemia:                                  EENT/Dental:  EENT/Dental Normal           Cardiovascular:  Cardiovascular Normal Exercise tolerance: good                                             Pulmonary:  Pulmonary Normal                       Renal/:  Renal/ Normal                 Hepatic/GI:     GERD                Musculoskeletal:  Musculoskeletal Normal                Neurological:      Headaches                                 Endocrine:  Endocrine Normal            Dermatological:  Skin Normal    Psych:  Psychiatric Normal                    Physical Exam  General: Well nourished, Cooperative, Oriented and Alert    Airway:  Mallampati: II / II  Mouth Opening: Normal  TM Distance: Normal  Neck ROM: Normal ROM    Dental:  Intact        Anesthesia Plan  Type of Anesthesia, risks & benefits discussed:    Anesthesia Type: MAC  Intra-op Monitoring Plan: Standard ASA Monitors  Post Op Pain Control Plan: multimodal analgesia  Induction:  IV  Airway Plan: Video and Direct  Informed Consent: Informed consent signed with the Patient and all parties understand the risks and agree with anesthesia plan.  All questions answered.   ASA Score: 3  Day of Surgery Review of History & Physical: H&P Update referred to the surgeon/provider.  Anesthesia Plan Notes: Symptomatic anemia. Hct 15 on admit    Ready For Surgery From  Anesthesia Perspective.     .

## 2025-07-30 NOTE — SUBJECTIVE & OBJECTIVE
Interval History: Feeling less weak and lightheaded today. EGD largely unremarkable. Awaiting colonoscopy tomorrow.     Review of Systems   Constitutional:  Positive for activity change and appetite change. Negative for chills, diaphoresis, fatigue and fever.   Respiratory:  Positive for chest tightness and shortness of breath. Negative for cough and wheezing.    Cardiovascular:  Positive for chest pain and palpitations. Negative for leg swelling.   Gastrointestinal:  Positive for blood in stool and nausea. Negative for abdominal pain, constipation, diarrhea and vomiting.   Genitourinary:  Negative for decreased urine volume, difficulty urinating, dysuria, hematuria and urgency.   Neurological:  Positive for dizziness, syncope (pre-syncope), weakness, light-headedness and headaches.     Objective:     Vital Signs (Most Recent):  Temp: 98.5 °F (36.9 °C) (07/30/25 1123)  Pulse: 84 (07/30/25 1216)  Resp: 16 (07/30/25 1216)  BP: 117/67 (07/30/25 1123)  SpO2: 99 % (07/30/25 1216) Vital Signs (24h Range):  Temp:  [97.1 °F (36.2 °C)-99.1 °F (37.3 °C)] 98.5 °F (36.9 °C)  Pulse:  [] 84  Resp:  [14-20] 16  SpO2:  [97 %-100 %] 99 %  BP: ()/(47-73) 117/67     Weight: 83.9 kg (185 lb)  Body mass index is 30.79 kg/m².    Intake/Output Summary (Last 24 hours) at 7/30/2025 1311  Last data filed at 7/30/2025 1041  Gross per 24 hour   Intake 2513.34 ml   Output 0 ml   Net 2513.34 ml         Physical Exam  Constitutional:       General: She is not in acute distress.  Pulmonary:      Effort: No respiratory distress.      Breath sounds: No wheezing.   Abdominal:      General: There is no distension.      Tenderness: There is no abdominal tenderness.   Musculoskeletal:      Right lower leg: No edema.      Left lower leg: No edema.   Neurological:      General: No focal deficit present.      Mental Status: She is oriented to person, place, and time.               Significant Labs: All pertinent labs within the past 24 hours  have been reviewed.    Significant Imaging: I have reviewed all pertinent imaging results/findings within the past 24 hours.

## 2025-07-30 NOTE — OR NURSING
Report received from Nita VALENTINO. Patient remains NPO since midnight. No episodes of nausea or vomiting in the past 2 hours. Patient awake, alert, oriented to person, time, place and situation.

## 2025-07-30 NOTE — PROGRESS NOTES
[facility] [address] Medicine  Progress Note    Patient Name: Carlyn Regan  MRN: [id number]  Patient Class: IP- Inpatient   Admission Date: 7/29/2025  Length of Stay: 1 days  Attending Physician: Trevor Mejía MD  Primary Care Provider: Florian Rowland MD        Principal Problem:Acute upper GI bleed        HPI:  Ms. Regan is a 40 YOF with PMHx of migraines, recurrent UTIs, GERD, and chronic intermittent constipation. She presents to ED due to complaints of SOB/ALONSO, chest pain, palpitations, weakness/fatigue, light headiness, dizziness, nausea, headaches, decreased appetite, and black stools for the last two days. She denies history of GIB or bloody/dark stools. She admits to frequent use of Goody's Powder (every other day or daily at times) and intermittent but more use of ibuprofen. She also notes only taking home pantoprazole as needed but not routinely.     She denies fever, chills, recent illness, changes in menstrual cycles or increased menstrual bleeding, abdominal pain, vomiting, diarrhea, constipation, urinary symptoms or hematuria, or decreased UOP. She lives with [facility], is independent in ADLs, and denies ambulatory aide use. She denies tobacco, marijuana, alcohol, or illicit drug use.     In the ED she is HDS and afebrile, however did have near syncopal episode after ambulating to the bathroom. Her fiance was nearby at the time and notes that she seemed to almost fall back after standing from toilet however did not have LOC or fall/injury. CBC with WBC 7.6, H/H 7.5/22.4 with repeat 6.2/18.5, platelets 238. Chemistry with , K 3.9, calcium 8.6, chloride 109, CO2 21, BUN 22, SCr 0.6, glucose 100. LFTs unremarkable. TSH 0.828. Hep C and HIV non reactive. Urine hCG negative. Flu and covid negative. UA noninfectious. CXR without acute cardiopulmonary proces    CTA GIB protocol:  1. No contrast extravasation within the large bowel or elsewhere to indicate site of active  gastrointestinal hemorrhage.   2. 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   3. Please see report for several additional findings.     The patient was admitted to the Hospital Medicine Service for further evaluation and management.     Overview/Hospital Course:  Transfused 2 units with imporvement in Hb to 8.4. EGD completed  - 'The esophagus was normal. Patchy mildly erythematous mucosa was found in the gastric antrum. Biopsies were taken with a cold forceps for Helicobacter pylori testing. No bleeding aetiology seen'. Plan for colonoscopy tomorrow.      Interval History: Feeling less weak and lightheaded today. EGD largely unremarkable. Awaiting colonoscopy tomorrow.     Review of Systems   Constitutional:  Positive for activity change and appetite change. Negative for chills, diaphoresis, fatigue and fever.   Respiratory:  Positive for chest tightness and shortness of breath. Negative for cough and wheezing.    Cardiovascular:  Positive for chest pain and palpitations. Negative for leg swelling.   Gastrointestinal:  Positive for blood in stool and nausea. Negative for abdominal pain, constipation, diarrhea and vomiting.   Genitourinary:  Negative for decreased urine volume, difficulty urinating, dysuria, hematuria and urgency.   Neurological:  Positive for dizziness, syncope (pre-syncope), weakness, light-headedness and headaches.     Objective:     Vital Signs (Most Recent):  Temp: 98.5 °F (36.9 °C) (07/30/25 1123)  Pulse: 84 (07/30/25 1216)  Resp: 16 (07/30/25 1216)  BP: 117/67 (07/30/25 1123)  SpO2: 99 % (07/30/25 1216) Vital Signs (24h Range):  Temp:  [97.1 °F (36.2 °C)-99.1 °F (37.3 °C)] 98.5 °F (36.9 °C)  Pulse:  [] 84  Resp:  [14-20] 16  SpO2:  [97 %-100 %] 99 %  BP: ()/(47-73) 117/67     Weight: 83.9 kg (185 lb)  Body mass index is 30.79 kg/m².    Intake/Output Summary (Last 24 hours) at 7/30/2025 1311  Last data filed at 7/30/2025 1041  Gross per 24 hour    Intake 2513.34 ml   Output 0 ml   Net 2513.34 ml         Physical Exam  Constitutional:       General: She is not in acute distress.  Pulmonary:      Effort: No respiratory distress.      Breath sounds: No wheezing.   Abdominal:      General: There is no distension.      Tenderness: There is no abdominal tenderness.   Musculoskeletal:      Right lower leg: No edema.      Left lower leg: No edema.   Neurological:      General: No focal deficit present.      Mental Status: She is oriented to person, place, and time.               Significant Labs: All pertinent labs within the past 24 hours have been reviewed.    Significant Imaging: I have reviewed all pertinent imaging results/findings within the past 24 hours.      Assessment & Plan  Acute upper GI bleed  Episode of syncope  GERD (gastroesophageal reflux disease)  Chronic constipation  Symptomatic anemia  -admitted due to acute suspected U-GIB with symptomatic anemia likely related to aspirin containing powder use with history of GERD and chronic constipation  -at admission HDS and afebrile  -ED workup detailed above  -GIB pathway initiated  -GI consulted  -pending 2 units PRBC transfusion >>> additional units pending H/H trending  -serial H/H monitoring  -begin IV PPI BID per pathway  -continue IVF hydration   -orthostatics qshift  -strict I&Os  -PRN supportive care as indicated   -hold home linzess  -monitor for development of constipation   -trend labs, address/replete electrolytes and transfuse as indicated  -fall precautions     7/30 - EGD completed  - 'The esophagus was normal. Patchy mildly erythematous mucosa was found in the gastric antrum. Biopsies were taken with a cold forceps for Helicobacter pylori testing. No bleeding aetiology seen'. Plan for colonoscopy tomorrow.       VTE Risk Mitigation (From admission, onward)           Ordered     Reason for No Pharmacological VTE Prophylaxis  Once        Question:  Reasons:  Answer:  Risk of Bleeding     07/29/25 1551     Place sequential compression device  Until discontinued         07/29/25 1551                    Discharge Planning   CINDY:      Code Status: Full Code   Medical Readiness for Discharge Date:   Discharge Plan A: Home              Trevor Morales MD  Department of Hospital Medicine   [facility] ([address])

## 2025-07-30 NOTE — PLAN OF CARE
Problem: Adult Inpatient Plan of Care  Goal: Plan of Care Review  Outcome: Progressing  Goal: Patient-Specific Goal (Individualized)  Outcome: Progressing  Goal: Absence of Hospital-Acquired Illness or Injury  Outcome: Progressing  Goal: Optimal Comfort and Wellbeing  Outcome: Progressing  Goal: Readiness for Transition of Care  Outcome: Progressing     Problem: Gastrointestinal Bleeding  Goal: Optimal Coping with Acute Illness  Outcome: Progressing  Goal: Hemostasis  Outcome: Progressing     Problem: Infection  Goal: Absence of Infection Signs and Symptoms  Outcome: Progressing     Problem: Pneumonia  Goal: Fluid Balance  Outcome: Progressing  Goal: Resolution of Infection Signs and Symptoms  Outcome: Progressing  Goal: Effective Oxygenation and Ventilation  Outcome: Progressing

## 2025-07-31 ENCOUNTER — ANESTHESIA (OUTPATIENT)
Dept: ENDOSCOPY | Facility: OTHER | Age: 40
DRG: 379 | End: 2025-07-31
Payer: MEDICAID

## 2025-07-31 VITALS
BODY MASS INDEX: 30.82 KG/M2 | SYSTOLIC BLOOD PRESSURE: 127 MMHG | RESPIRATION RATE: 16 BRPM | DIASTOLIC BLOOD PRESSURE: 69 MMHG | OXYGEN SATURATION: 100 % | TEMPERATURE: 99 F | HEART RATE: 84 BPM | HEIGHT: 65 IN | WEIGHT: 185 LBS

## 2025-07-31 LAB
ANION GAP (OHS): 7 MMOL/L (ref 8–16)
BUN SERPL-MCNC: 6 MG/DL (ref 6–20)
CALCIUM SERPL-MCNC: 8.9 MG/DL (ref 8.7–10.5)
CHLORIDE SERPL-SCNC: 113 MMOL/L (ref 95–110)
CO2 SERPL-SCNC: 23 MMOL/L (ref 23–29)
CREAT SERPL-MCNC: 0.7 MG/DL (ref 0.5–1.4)
ERYTHROCYTE [DISTWIDTH] IN BLOOD BY AUTOMATED COUNT: 15.1 % (ref 11.5–14.5)
ESTROGEN SERPL-MCNC: NORMAL PG/ML
GFR SERPLBLD CREATININE-BSD FMLA CKD-EPI: >60 ML/MIN/1.73/M2
GLUCOSE SERPL-MCNC: 85 MG/DL (ref 70–110)
HCT VFR BLD AUTO: 25.6 % (ref 37–48.5)
HGB BLD-MCNC: 8.6 GM/DL (ref 12–16)
INSULIN SERPL-ACNC: NORMAL U[IU]/ML
LAB AP CLINICAL INFORMATION: NORMAL
LAB AP DIAGNOSIS CATEGORY: NORMAL
LAB AP GROSS DESCRIPTION: NORMAL
LAB AP PERFORMING LOCATION(S): NORMAL
LAB AP REPORT FOOTNOTES: NORMAL
MCH RBC QN AUTO: 30.8 PG (ref 27–31)
MCHC RBC AUTO-ENTMCNC: 33.6 G/DL (ref 32–36)
MCV RBC AUTO: 92 FL (ref 82–98)
PLATELET # BLD AUTO: 198 K/UL (ref 150–450)
PMV BLD AUTO: 9.3 FL (ref 9.2–12.9)
POTASSIUM SERPL-SCNC: 3.6 MMOL/L (ref 3.5–5.1)
RBC # BLD AUTO: 2.79 M/UL (ref 4–5.4)
SODIUM SERPL-SCNC: 143 MMOL/L (ref 136–145)
WBC # BLD AUTO: 6.37 K/UL (ref 3.9–12.7)

## 2025-07-31 PROCEDURE — 80048 BASIC METABOLIC PNL TOTAL CA: CPT | Performed by: STUDENT IN AN ORGANIZED HEALTH CARE EDUCATION/TRAINING PROGRAM

## 2025-07-31 PROCEDURE — 37000009 HC ANESTHESIA EA ADD 15 MINS: Performed by: INTERNAL MEDICINE

## 2025-07-31 PROCEDURE — 0DJD8ZZ INSPECTION OF LOWER INTESTINAL TRACT, VIA NATURAL OR ARTIFICIAL OPENING ENDOSCOPIC: ICD-10-PCS | Performed by: INTERNAL MEDICINE

## 2025-07-31 PROCEDURE — 85027 COMPLETE CBC AUTOMATED: CPT | Performed by: STUDENT IN AN ORGANIZED HEALTH CARE EDUCATION/TRAINING PROGRAM

## 2025-07-31 PROCEDURE — 37000008 HC ANESTHESIA 1ST 15 MINUTES: Performed by: INTERNAL MEDICINE

## 2025-07-31 PROCEDURE — 25000003 PHARM REV CODE 250: Performed by: INTERNAL MEDICINE

## 2025-07-31 PROCEDURE — 63600175 PHARM REV CODE 636 W HCPCS: Performed by: NURSE ANESTHETIST, CERTIFIED REGISTERED

## 2025-07-31 PROCEDURE — 63600175 PHARM REV CODE 636 W HCPCS: Performed by: STUDENT IN AN ORGANIZED HEALTH CARE EDUCATION/TRAINING PROGRAM

## 2025-07-31 PROCEDURE — 63600175 PHARM REV CODE 636 W HCPCS: Performed by: INTERNAL MEDICINE

## 2025-07-31 PROCEDURE — 36415 COLL VENOUS BLD VENIPUNCTURE: CPT | Performed by: STUDENT IN AN ORGANIZED HEALTH CARE EDUCATION/TRAINING PROGRAM

## 2025-07-31 PROCEDURE — 25000003 PHARM REV CODE 250: Performed by: NURSE PRACTITIONER

## 2025-07-31 RX ORDER — PROPOFOL 10 MG/ML
VIAL (ML) INTRAVENOUS
Status: DISCONTINUED | OUTPATIENT
Start: 2025-07-31 | End: 2025-07-31

## 2025-07-31 RX ORDER — OXYCODONE HYDROCHLORIDE 5 MG/1
5 TABLET ORAL
Status: DISCONTINUED | OUTPATIENT
Start: 2025-07-31 | End: 2025-07-31

## 2025-07-31 RX ORDER — HYDROMORPHONE HYDROCHLORIDE 2 MG/ML
0.4 INJECTION, SOLUTION INTRAMUSCULAR; INTRAVENOUS; SUBCUTANEOUS EVERY 5 MIN PRN
Status: DISCONTINUED | OUTPATIENT
Start: 2025-07-31 | End: 2025-07-31

## 2025-07-31 RX ORDER — GLUCAGON 1 MG
1 KIT INJECTION
Status: DISCONTINUED | OUTPATIENT
Start: 2025-07-31 | End: 2025-07-31

## 2025-07-31 RX ORDER — PROCHLORPERAZINE EDISYLATE 5 MG/ML
5 INJECTION INTRAMUSCULAR; INTRAVENOUS EVERY 30 MIN PRN
Status: DISCONTINUED | OUTPATIENT
Start: 2025-07-31 | End: 2025-07-31

## 2025-07-31 RX ORDER — LIDOCAINE HYDROCHLORIDE 20 MG/ML
INJECTION INTRAVENOUS
Status: DISCONTINUED | OUTPATIENT
Start: 2025-07-31 | End: 2025-07-31

## 2025-07-31 RX ORDER — SODIUM CHLORIDE 0.9 % (FLUSH) 0.9 %
3 SYRINGE (ML) INJECTION
Status: DISCONTINUED | OUTPATIENT
Start: 2025-07-31 | End: 2025-07-31

## 2025-07-31 RX ORDER — PANTOPRAZOLE SODIUM 40 MG/1
40 TABLET, DELAYED RELEASE ORAL EVERY MORNING
Qty: 90 TABLET | Refills: 0 | Status: SHIPPED | OUTPATIENT
Start: 2025-07-31

## 2025-07-31 RX ADMIN — ACETAMINOPHEN 650 MG: 325 TABLET, FILM COATED ORAL at 11:07

## 2025-07-31 RX ADMIN — PROPOFOL 20 MG: 10 INJECTION, EMULSION INTRAVENOUS at 07:07

## 2025-07-31 RX ADMIN — LIDOCAINE HYDROCHLORIDE 100 MG: 20 INJECTION, SOLUTION INTRAVENOUS at 07:07

## 2025-07-31 RX ADMIN — PROPOFOL 30 MG: 10 INJECTION, EMULSION INTRAVENOUS at 07:07

## 2025-07-31 RX ADMIN — SODIUM CHLORIDE, SODIUM LACTATE, POTASSIUM CHLORIDE, AND CALCIUM CHLORIDE: .6; .31; .03; .02 INJECTION, SOLUTION INTRAVENOUS at 07:07

## 2025-07-31 RX ADMIN — PANTOPRAZOLE SODIUM 40 MG: 40 INJECTION, POWDER, FOR SOLUTION INTRAVENOUS at 10:07

## 2025-07-31 RX ADMIN — PROPOFOL 40 MG: 10 INJECTION, EMULSION INTRAVENOUS at 07:07

## 2025-07-31 RX ADMIN — ONDANSETRON 8 MG: 8 TABLET, ORALLY DISINTEGRATING ORAL at 02:07

## 2025-07-31 RX ADMIN — PROPOFOL 150 MCG/KG/MIN: 10 INJECTION, EMULSION INTRAVENOUS at 07:07

## 2025-07-31 RX ADMIN — SODIUM CHLORIDE, POTASSIUM CHLORIDE, SODIUM LACTATE AND CALCIUM CHLORIDE: 600; 310; 30; 20 INJECTION, SOLUTION INTRAVENOUS at 05:07

## 2025-07-31 NOTE — ANESTHESIA POSTPROCEDURE EVALUATION
Anesthesia Post Evaluation    Patient: Carlyn Regan    Procedure(s) Performed: Procedure(s) (LRB):  COLONOSCOPY (N/A)    Final Anesthesia Type: MAC      Patient location during evaluation: PACU  Patient participation: Yes- Able to Participate  Level of consciousness: awake and alert  Post-procedure vital signs: reviewed and stable  Pain management: adequate  Airway patency: patent    PONV status at discharge: No PONV  Anesthetic complications: no      Cardiovascular status: hemodynamically stable  Respiratory status: unassisted  Hydration status: euvolemic  Follow-up not needed.              Vitals Value Taken Time   /58 07/31/25 08:34   Temp 36.7 °C (98 °F) 07/31/25 08:34   Pulse 81 07/31/25 08:34   Resp 16 07/31/25 08:12   SpO2 100 % 07/31/25 08:34         Event Time   Out of Recovery 07/31/2025 09:08:37         Pain/Brian Score: Pain Rating Prior to Med Admin: 10 (7/30/2025  2:26 PM)  Brian Score: 9 (7/31/2025  8:45 AM)

## 2025-07-31 NOTE — TRANSFER OF CARE
"Anesthesia Transfer of Care Note    Patient: Carlyn Regan    Procedure(s) Performed: Procedure(s) (LRB):  COLONOSCOPY (N/A)    Patient location: PACU    Anesthesia Type: general    Transport from OR: Transported from OR on room air with adequate spontaneous ventilation    Post pain: adequate analgesia    Post assessment: no apparent anesthetic complications    Post vital signs: stable    Level of consciousness: awake    Nausea/Vomiting: no nausea/vomiting    Complications: none    Transfer of care protocol was followed      Last vitals: Visit Vitals  /68 (BP Location: Left arm, Patient Position: Lying)   Pulse 90   Temp 36.3 °C (97.4 °F) (Oral)   Resp 15   Ht 5' 5" (1.651 m)   Wt 83.9 kg (185 lb)   SpO2 98%   Breastfeeding No   BMI 30.79 kg/m²     "

## 2025-07-31 NOTE — ANESTHESIA PREPROCEDURE EVALUATION
07/31/2025  Carlyn Regan is a 40 y.o., female.      Pre-op Assessment    I have reviewed the Patient Summary Reports.     I have reviewed the Nursing Notes. I have reviewed the NPO Status.   I have reviewed the Medications.     Review of Systems  Anesthesia Hx:  No problems with previous Anesthesia             Denies Family Hx of Anesthesia complications.    Denies Personal Hx of Anesthesia complications.                    Social:  Non-Smoker       Hematology/Oncology:    Oncology Normal    -- Anemia:                                  EENT/Dental:  EENT/Dental Normal           Cardiovascular:  Cardiovascular Normal Exercise tolerance: good                                             Pulmonary:  Pulmonary Normal                       Renal/:  Renal/ Normal                 Hepatic/GI:     GERD                Musculoskeletal:  Musculoskeletal Normal                Neurological:      Headaches                                 Endocrine:  Endocrine Normal            Dermatological:  Skin Normal    Psych:  Psychiatric Normal                    Physical Exam  General: Well nourished, Cooperative, Oriented and Alert    Airway:  Mallampati: II / II  Mouth Opening: Normal  TM Distance: Normal  Neck ROM: Normal ROM    Dental:  Intact        Anesthesia Plan  Type of Anesthesia, risks & benefits discussed:    Anesthesia Type: MAC  Intra-op Monitoring Plan: Standard ASA Monitors  Post Op Pain Control Plan: multimodal analgesia  Induction:  IV  Airway Plan: Video and Direct  Informed Consent: Informed consent signed with the Patient and all parties understand the risks and agree with anesthesia plan.  All questions answered.   ASA Score: 3  Day of Surgery Review of History & Physical: H&P Update referred to the surgeon/provider.  Anesthesia Plan Notes: Symptomatic anemia. Hct 15 on admit    Ready For Surgery From  Anesthesia Perspective.     .

## 2025-07-31 NOTE — PLAN OF CARE
Problem: Adult Inpatient Plan of Care  Goal: Plan of Care Review  7/31/2025 0358 by Ira Aguiar RN  Outcome: Progressing  7/31/2025 0348 by Ira Aguiar RN  Outcome: Progressing  Goal: Patient-Specific Goal (Individualized)  7/31/2025 0358 by Ira Aguiar RN  Outcome: Progressing  7/31/2025 0348 by Ira Aguiar RN  Outcome: Progressing  Goal: Absence of Hospital-Acquired Illness or Injury  7/31/2025 0358 by Ira Aguiar RN  Outcome: Progressing  7/31/2025 0348 by Ira Aguiar RN  Outcome: Progressing  Goal: Optimal Comfort and Wellbeing  7/31/2025 0358 by Ira Aguiar RN  Outcome: Progressing  7/31/2025 0348 by Ira Aguiar RN  Outcome: Progressing  Goal: Readiness for Transition of Care  7/31/2025 0358 by Ira Aguiar RN  Outcome: Progressing  7/31/2025 0348 by Ira Aguiar RN  Outcome: Progressing     Problem: Gastrointestinal Bleeding  Goal: Optimal Coping with Acute Illness  Outcome: Progressing

## 2025-07-31 NOTE — DISCHARGE SUMMARY
[facility] Surg [address] Medicine  Discharge Summary      Patient Name: Carlyn Regan  MRN: [id number]  [facility]: [id number]  Patient Class: IP- Inpatient  Admission Date: 7/29/2025  Hospital Length of Stay: 2 days  Discharge Date and Time: 07/31/2025 10:02 AM  Attending Physician: Trevor Mejía MD   Discharging Provider: Trevor Morales MD  Primary Care Provider: Florian Rowland MD    Primary Care Team: Networked reference to record PCT     HPI:   Ms. Regan is a 40 YOF with PMHx of migraines, recurrent UTIs, GERD, and chronic intermittent constipation. She presents to ED due to complaints of SOB/ALONSO, chest pain, palpitations, weakness/fatigue, light headiness, dizziness, nausea, headaches, decreased appetite, and black stools for the last two days. She denies history of GIB or bloody/dark stools. She admits to frequent use of Goody's Powder (every other day or daily at times) and intermittent but more use of ibuprofen. She also notes only taking home pantoprazole as needed but not routinely.     She denies fever, chills, recent illness, changes in menstrual cycles or increased menstrual bleeding, abdominal pain, vomiting, diarrhea, constipation, urinary symptoms or hematuria, or decreased UOP. She lives with sharath, is independent in ADLs, and denies ambulatory aide use. She denies tobacco, marijuana, alcohol, or illicit drug use.     In the ED she is HDS and afebrile, however did have near syncopal episode after ambulating to the bathroom. Her fiance was nearby at the time and notes that she seemed to almost fall back after standing from toilet however did not have LOC or fall/injury. CBC with WBC 7.6, H/H 7.5/22.4 with repeat 6.2/18.5, platelets 238. Chemistry with , K 3.9, calcium 8.6, chloride 109, CO2 21, BUN 22, SCr 0.6, glucose 100. LFTs unremarkable. TSH 0.828. Hep C and HIV non reactive. Urine hCG negative. Flu and covid negative. UA noninfectious. CXR without acute  cardiopulmonary proces    CTA GIB protocol:  1. No contrast extravasation within the large bowel or elsewhere to indicate site of active gastrointestinal hemorrhage.   2. 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   3. Please see report for several additional findings.     The patient was admitted to the Hospital Medicine Service for further evaluation and management.     Procedure(s) (LRB):  COLONOSCOPY (N/A)    EGD     Hospital Course:   Transfused 2 units pRBC with improvement in Hb to 8.4. EGD completed  - 'The esophagus was normal. Patchy mildly erythematous mucosa was found in the gastric antrum. Biopsies were taken with a cold forceps for Helicobacter pylori testing.' Colonoscopy - 'The entire examined colon is normal on direct and retroflexion views.' As such, no clear bleeding aetiology seen. Hemoglobin stable. Dicussed with GI and pt stable for discharge but will need close follow up with GI (referral placed) and repeat labs to monitor hemoglobin next week. Pt advised to stop use of goody powder/NSAID's. Refill home protonix.      Goals of Care Treatment Preferences:  Code Status: Full Code         Consults:   Consults (From admission, onward)          Status Ordering Provider     Inpatient consult to Gastroenterology  Once        Provider:  Sobia Worrell MD    Completed REMY PEÑA            Final Active Diagnoses:    Diagnosis Date Noted POA    PRINCIPAL PROBLEM:  Acute upper GI bleed [K92.2] 07/29/2025 Yes    GERD (gastroesophageal reflux disease) [K21.9] 07/29/2025 Yes    Chronic constipation [K59.09] 07/29/2025 Yes    Episode of syncope [R55] 07/29/2025 Yes    Symptomatic anemia [D64.9] 02/15/2016 Yes      Problems Resolved During this Admission:       Discharged Condition: good    Disposition: Home or Self Care    Follow Up:   Follow-up Information       Florian Rowland MD Follow up.    Specialty: Internal Medicine  Contact information:  [address] [place] [address]  [facility] [address]  [place] [address]  [phone]                           Patient Instructions:      Ambulatory referral/consult to Gastroenterology   Standing Status: Future   Referral Priority: Urgent Referral Type: Consultation   Referral Reason: Specialty Services Required   Requested Specialty: Gastroenterology   Number of Visits Requested: 1     Medications:  Reconciled Home Medications:      Medication List        CONTINUE taking these medications      cyclobenzaprine 10 MG tablet  Commonly known as: FLEXERIL  Take 10 mg by mouth nightly as needed.     LINZESS 145 mcg Cap capsule  Generic drug: linaCLOtide  Take 145 mcg by mouth.     ondansetron 4 MG Tbdl  Commonly known as: ZOFRAN-ODT  Take 1 tablet (4 mg total) by mouth every 8 (eight) hours as needed (for nausea).     pantoprazole 40 MG tablet  Commonly known as: PROTONIX  Take 1 tablet (40 mg total) by mouth every morning.     sumatriptan 100 MG tablet  Commonly known as: IMITREX  Take by mouth.     topiramate 50 MG tablet  Commonly known as: TOPAMAX  Take 50 mg by mouth 2 (two) times daily.     traMADoL 50 mg tablet  Commonly known as: ULTRAM  Take 50 mg by mouth.            STOP taking these medications      ibuprofen 600 MG tablet  Commonly known as: ADVIL,MOTRIN                      Time spent on the discharge of patient: 35 minutes         Trevor Morales MD  Department of Hospital Medicine  [facility] ([address])

## 2025-07-31 NOTE — PLAN OF CARE
"SELAM faxed patient discharge summary to patient PCP.     Your fax has been successfully sent to [phone] at [phone].  ------------------------------------------------------------  From: [id number]  ------------------------------------------------------------  7/31/2025 10:15:57 AM Transmission Record          Sent to [phone] with remote ID "          Result: (4/3;0/0) Line broken (no loop current)          Page record: NONE SENT          Elapsed time: 00:03 on channel 28    7/31/2025 10:21:04 AM Transmission Record          Sent to [phone] with remote ID "[email or web address]"          Result: (0/339;0/0) Success          Page record: 1 - 10          Elapsed time: 03:17 on channel 29   "

## 2025-07-31 NOTE — OR NURSING
Pt continues without c/o pain at this time. Pt voids on bedpan successfully. Report called to Macarena VALENTINO [address] [place] [address]. Pt without change from previous assessment. Preparedfor transfer to [address] [place] [address]. Pt placed on transport.

## 2025-07-31 NOTE — OR NURSING
Procedural report placed on chart. Pt sent back with phone and all jewelry placed back on patient.

## 2025-07-31 NOTE — [employer] PATIENT INSTRUCTIONS
Discharge Summary/Instructions after an Endoscopic Procedure  Patient Name: Carlyn Regan  Patient MRN: [id number]  Patient YOB: 1985 Thursday, July 31, 2025  Terry Gabriel MD  RESTRICTIONS:  During your procedure today, you received medications for sedation.  These   medications may affect your judgment, balance and coordination.  Therefore,   for 24 hours, you have the following restrictions:   - DO NOT drive a car, operate machinery, make legal/financial decisions,   sign important papers or drink alcohol.    ACTIVITY:  Today: no heavy lifting, straining or running due to procedural   sedation/anesthesia.  The following day: return to full activity including work.  DIET:  Eat and drink normally unless instructed otherwise.     TREATMENT FOR COMMON SIDE EFFECTS:  - Mild abdominal pain, nausea, belching, bloating or excessive gas:  rest,   eat lightly and use a heating pad.  - Sore Throat: treat with throat lozenges and/or gargle with warm salt   water.  - Because air was used during the procedure, expelling large amounts of air   from your rectum or belching is normal.  - If a bowel prep was taken, you may not have a bowel movement for 1-3 days.    This is normal.  SYMPTOMS TO WATCH FOR AND REPORT TO YOUR PHYSICIAN:  1. Abdominal pain or bloating, other than gas cramps.  2. Chest pain.  3. Back pain.  4. Signs of infection such as: chills or fever occurring within 24 hours   after the procedure.  5. Rectal bleeding, which would show as bright red, maroon, or black stools.   (A tablespoon of blood from the rectum is not serious, especially if   hemorrhoids are present.)  6. Vomiting.  7. Weakness or dizziness.  GO DIRECTLY TO THE NEAREST EMERGENCY ROOM IF YOU HAVE ANY OF THE FOLLOWING:      Difficulty breathing              Chills and/or fever over 101 F   Persistent vomiting and/or vomiting blood   Severe abdominal pain   Severe chest pain   Black, tarry stools   Bleeding- more than one  tablespoon   Any other symptom or condition that you feel may need urgent attention  Your doctor recommends these additional instructions:  If any biopsies were taken, your doctors clinic will contact you in 1 to 2   weeks with any results.  - [facility] to [facility].   - Resume regular diet.   - Continue present medications.  For questions, problems or results please call your physician - Terry Gabriel MD at Work:  [phone].  [facility], EMERGENCY ROOM PHONE NUMBER: [phone], [facility]   [phone].  IF A COMPLICATION OR EMERGENCY SITUATION ARISES AND YOU ARE UNABLE TO REACH   YOUR PHYSICIAN - GO DIRECTLY TO THE EMERGENCY ROOM.  Terry Gabriel MD  7/31/2025 8:06:13 AM  This report has been verified and signed electronically.  Dear patient,  As a result of recent federal legislation (The Federal Cures Act), you may   receive lab or pathology results from your procedure in your [facility]   account before your physician is able to contact you. Your physician or   their representative will relay the results to you with their   recommendations at their soonest availability.  Thank you,  [employer]

## 2025-07-31 NOTE — PLAN OF CARE
Case Management Final Discharge Note    Discharge Disposition: home     New DME ordered / company name: none    Relevant SDOH / Transition of Care Barriers:  none    Person available to provide assistance at home when needed and their contact information: self    Scheduled followup appointment: PCP    Referrals placed: none    Transportation: Patient family/friend to provide transportation home.         Patient and family educated on discharge services and updated on DC plan. Bedside RN notified, patient clear to discharge from Case Management Perspective.       [facility] ([address])  Discharge Final Note    Primary Care Provider: Florian Rowland MD    Expected Discharge Date: 7/31/2025    Final Discharge Note (most recent)       Final Note - 07/31/25 1016          Final Note    Assessment Type Final Discharge Note     Anticipated Discharge Disposition Home or Self Care     Hospital Resources/Appts/Education Provided Provided patient/caregiver with written discharge plan information;Appointments scheduled and added to AVS        Post-Acute Status    Discharge Delays None known at this time                     Important Message from Medicare             Contact Info       Florian Rowland MD   Specialty: Internal Medicine   Relationship: PCP - [address]  [place] [address]   Phone: [phone]       Next Steps: Follow up on 8/5/2025    Instructions: 9:00am

## 2025-08-02 NOTE — PHYSICIAN QUERY
Please clarify the pathology findings:  Pathology findings noted above are with negative findings